# Patient Record
Sex: FEMALE | Race: WHITE | NOT HISPANIC OR LATINO | Employment: FULL TIME | URBAN - METROPOLITAN AREA
[De-identification: names, ages, dates, MRNs, and addresses within clinical notes are randomized per-mention and may not be internally consistent; named-entity substitution may affect disease eponyms.]

---

## 2022-09-19 ENCOUNTER — ANESTHESIA EVENT (OUTPATIENT)
Dept: OBGYN | Facility: CLINIC | Age: 32
End: 2022-09-19
Payer: COMMERCIAL

## 2022-09-19 ENCOUNTER — ANESTHESIA (OUTPATIENT)
Dept: OBGYN | Facility: CLINIC | Age: 32
End: 2022-09-19
Payer: COMMERCIAL

## 2022-09-19 ENCOUNTER — HOSPITAL ENCOUNTER (INPATIENT)
Facility: CLINIC | Age: 32
LOS: 15 days | Discharge: HOME HEALTH | End: 2022-10-04
Attending: OBSTETRICS & GYNECOLOGY | Admitting: OBSTETRICS & GYNECOLOGY
Payer: COMMERCIAL

## 2022-09-19 PROBLEM — O09.621 SUPERVISION OF SUBSEQUENT PREGNANCY, LESS THAN 16 YEARS OLD IN FIRST TRIMESTER: Status: ACTIVE | Noted: 2022-05-06

## 2022-09-19 PROBLEM — Z72.0 TOBACCO USE: Status: ACTIVE | Noted: 2022-09-19

## 2022-09-19 PROBLEM — E28.2 PCOS (POLYCYSTIC OVARIAN SYNDROME): Status: ACTIVE | Noted: 2022-09-19

## 2022-09-19 PROBLEM — O30.049 TWIN PREGNANCY, DICHORIONIC/DIAMNIOTIC, UNSPECIFIED TRIMESTER: Status: ACTIVE | Noted: 2022-04-09

## 2022-09-19 LAB
ABO/RH(D): ABNORMAL
ANTIBODY SCREEN: POSITIVE
BASOPHILS # BLD AUTO: 0 10E3/UL (ref 0–0.2)
BASOPHILS NFR BLD AUTO: 0 %
CREAT SERPL-MCNC: 0.47 MG/DL (ref 0.52–1.04)
EOSINOPHIL # BLD AUTO: 0 10E3/UL (ref 0–0.7)
EOSINOPHIL NFR BLD AUTO: 0 %
ERYTHROCYTE [DISTWIDTH] IN BLOOD BY AUTOMATED COUNT: 12.4 % (ref 10–15)
FETAL RBC % LFV: 0 %
FETAL RBC (ML): 0 ML
GFR SERPL CREATININE-BSD FRML MDRD: >90 ML/MIN/1.73M2
HCT VFR BLD AUTO: 34 % (ref 35–47)
HGB BLD-MCNC: 11.2 G/DL (ref 11.7–15.7)
HOLD SPECIMEN: NORMAL
HOLD SPECIMEN: NORMAL
IF INDICATED RECOMMENDED DOSE OF RH IMMUNE GLOBULIN UG: 300 UG
IMM GRANULOCYTES # BLD: 0.2 10E3/UL
IMM GRANULOCYTES NFR BLD: 1 %
LYMPHOCYTES # BLD AUTO: 2.1 10E3/UL (ref 0.8–5.3)
LYMPHOCYTES NFR BLD AUTO: 11 %
MCH RBC QN AUTO: 29.5 PG (ref 26.5–33)
MCHC RBC AUTO-ENTMCNC: 32.9 G/DL (ref 31.5–36.5)
MCV RBC AUTO: 90 FL (ref 78–100)
MONOCYTES # BLD AUTO: 0.2 10E3/UL (ref 0–1.3)
MONOCYTES NFR BLD AUTO: 1 %
NEUTROPHILS # BLD AUTO: 16.2 10E3/UL (ref 1.6–8.3)
NEUTROPHILS NFR BLD AUTO: 87 %
NRBC # BLD AUTO: 0 10E3/UL
NRBC BLD AUTO-RTO: 0 /100
PLATELET # BLD AUTO: 189 10E3/UL (ref 150–450)
RBC # BLD AUTO: 3.8 10E6/UL (ref 3.8–5.2)
SARS-COV-2 RNA RESP QL NAA+PROBE: NEGATIVE
SPECIMEN EXPIRATION DATE: ABNORMAL
WBC # BLD AUTO: 18.7 10E3/UL (ref 4–11)

## 2022-09-19 PROCEDURE — U0003 INFECTIOUS AGENT DETECTION BY NUCLEIC ACID (DNA OR RNA); SEVERE ACUTE RESPIRATORY SYNDROME CORONAVIRUS 2 (SARS-COV-2) (CORONAVIRUS DISEASE [COVID-19]), AMPLIFIED PROBE TECHNIQUE, MAKING USE OF HIGH THROUGHPUT TECHNOLOGIES AS DESCRIBED BY CMS-2020-01-R: HCPCS | Performed by: STUDENT IN AN ORGANIZED HEALTH CARE EDUCATION/TRAINING PROGRAM

## 2022-09-19 PROCEDURE — 76812 OB US DETAILED ADDL FETUS: CPT | Mod: 26 | Performed by: OBSTETRICS & GYNECOLOGY

## 2022-09-19 PROCEDURE — 99232 SBSQ HOSP IP/OBS MODERATE 35: CPT | Performed by: NURSE PRACTITIONER

## 2022-09-19 PROCEDURE — 36415 COLL VENOUS BLD VENIPUNCTURE: CPT | Performed by: STUDENT IN AN ORGANIZED HEALTH CARE EDUCATION/TRAINING PROGRAM

## 2022-09-19 PROCEDURE — 85004 AUTOMATED DIFF WBC COUNT: CPT | Performed by: STUDENT IN AN ORGANIZED HEALTH CARE EDUCATION/TRAINING PROGRAM

## 2022-09-19 PROCEDURE — 85460 HEMOGLOBIN FETAL: CPT | Performed by: STUDENT IN AN ORGANIZED HEALTH CARE EDUCATION/TRAINING PROGRAM

## 2022-09-19 PROCEDURE — 86901 BLOOD TYPING SEROLOGIC RH(D): CPT | Performed by: STUDENT IN AN ORGANIZED HEALTH CARE EDUCATION/TRAINING PROGRAM

## 2022-09-19 PROCEDURE — 370N000003 HC ANESTHESIA WARD SERVICE

## 2022-09-19 PROCEDURE — 99221 1ST HOSP IP/OBS SF/LOW 40: CPT | Mod: 25 | Performed by: OBSTETRICS & GYNECOLOGY

## 2022-09-19 PROCEDURE — 250N000011 HC RX IP 250 OP 636: Performed by: OBSTETRICS & GYNECOLOGY

## 2022-09-19 PROCEDURE — 250N000013 HC RX MED GY IP 250 OP 250 PS 637: Performed by: STUDENT IN AN ORGANIZED HEALTH CARE EDUCATION/TRAINING PROGRAM

## 2022-09-19 PROCEDURE — 00HU33Z INSERTION OF INFUSION DEVICE INTO SPINAL CANAL, PERCUTANEOUS APPROACH: ICD-10-PCS | Performed by: STUDENT IN AN ORGANIZED HEALTH CARE EDUCATION/TRAINING PROGRAM

## 2022-09-19 PROCEDURE — 120N000002 HC R&B MED SURG/OB UMMC

## 2022-09-19 PROCEDURE — 258N000003 HC RX IP 258 OP 636: Performed by: STUDENT IN AN ORGANIZED HEALTH CARE EDUCATION/TRAINING PROGRAM

## 2022-09-19 PROCEDURE — 76811 OB US DETAILED SNGL FETUS: CPT | Mod: 26 | Performed by: OBSTETRICS & GYNECOLOGY

## 2022-09-19 PROCEDURE — 3E0R3BZ INTRODUCTION OF ANESTHETIC AGENT INTO SPINAL CANAL, PERCUTANEOUS APPROACH: ICD-10-PCS | Performed by: STUDENT IN AN ORGANIZED HEALTH CARE EDUCATION/TRAINING PROGRAM

## 2022-09-19 PROCEDURE — 76818 FETAL BIOPHYS PROFILE W/NST: CPT | Mod: 26 | Performed by: OBSTETRICS & GYNECOLOGY

## 2022-09-19 PROCEDURE — 87086 URINE CULTURE/COLONY COUNT: CPT | Performed by: STUDENT IN AN ORGANIZED HEALTH CARE EDUCATION/TRAINING PROGRAM

## 2022-09-19 PROCEDURE — 250N000011 HC RX IP 250 OP 636: Performed by: STUDENT IN AN ORGANIZED HEALTH CARE EDUCATION/TRAINING PROGRAM

## 2022-09-19 PROCEDURE — 36415 COLL VENOUS BLD VENIPUNCTURE: CPT | Performed by: OBSTETRICS & GYNECOLOGY

## 2022-09-19 PROCEDURE — 250N000011 HC RX IP 250 OP 636

## 2022-09-19 PROCEDURE — 258N000003 HC RX IP 258 OP 636: Performed by: OBSTETRICS & GYNECOLOGY

## 2022-09-19 PROCEDURE — 82565 ASSAY OF CREATININE: CPT | Performed by: OBSTETRICS & GYNECOLOGY

## 2022-09-19 PROCEDURE — 59025 FETAL NON-STRESS TEST: CPT | Mod: 26 | Performed by: OBSTETRICS & GYNECOLOGY

## 2022-09-19 PROCEDURE — 87653 STREP B DNA AMP PROBE: CPT | Performed by: STUDENT IN AN ORGANIZED HEALTH CARE EDUCATION/TRAINING PROGRAM

## 2022-09-19 PROCEDURE — 86780 TREPONEMA PALLIDUM: CPT | Performed by: STUDENT IN AN ORGANIZED HEALTH CARE EDUCATION/TRAINING PROGRAM

## 2022-09-19 RX ORDER — FENTANYL CITRATE-0.9 % NACL/PF 10 MCG/ML
100 PLASTIC BAG, INJECTION (ML) INTRAVENOUS EVERY 5 MIN PRN
Status: DISCONTINUED | OUTPATIENT
Start: 2022-09-19 | End: 2022-09-22

## 2022-09-19 RX ORDER — PROCHLORPERAZINE 25 MG
25 SUPPOSITORY, RECTAL RECTAL EVERY 12 HOURS PRN
Status: DISCONTINUED | OUTPATIENT
Start: 2022-09-19 | End: 2022-09-22

## 2022-09-19 RX ORDER — CARBOPROST TROMETHAMINE 250 UG/ML
250 INJECTION, SOLUTION INTRAMUSCULAR
Status: DISCONTINUED | OUTPATIENT
Start: 2022-09-19 | End: 2022-09-22

## 2022-09-19 RX ORDER — ACETAMINOPHEN 325 MG/1
650 TABLET ORAL EVERY 4 HOURS PRN
Status: DISCONTINUED | OUTPATIENT
Start: 2022-09-19 | End: 2022-10-02

## 2022-09-19 RX ORDER — METOCLOPRAMIDE HYDROCHLORIDE 5 MG/ML
10 INJECTION INTRAMUSCULAR; INTRAVENOUS EVERY 6 HOURS PRN
Status: DISCONTINUED | OUTPATIENT
Start: 2022-09-19 | End: 2022-10-02

## 2022-09-19 RX ORDER — OXYTOCIN 10 [USP'U]/ML
10 INJECTION, SOLUTION INTRAMUSCULAR; INTRAVENOUS
Status: DISCONTINUED | OUTPATIENT
Start: 2022-09-19 | End: 2022-09-22

## 2022-09-19 RX ORDER — OXYTOCIN/0.9 % SODIUM CHLORIDE 30/500 ML
100-340 PLASTIC BAG, INJECTION (ML) INTRAVENOUS CONTINUOUS PRN
Status: DISCONTINUED | OUTPATIENT
Start: 2022-09-19 | End: 2022-09-22

## 2022-09-19 RX ORDER — CALCIUM CARBONATE 500(1250)
1 TABLET,CHEWABLE ORAL
COMMUNITY

## 2022-09-19 RX ORDER — PRENATAL VIT/IRON FUM/FOLIC AC 27MG-0.8MG
1 TABLET ORAL DAILY
Status: DISCONTINUED | OUTPATIENT
Start: 2022-09-19 | End: 2022-10-02

## 2022-09-19 RX ORDER — METOCLOPRAMIDE HYDROCHLORIDE 5 MG/ML
10 INJECTION INTRAMUSCULAR; INTRAVENOUS EVERY 6 HOURS PRN
Status: DISCONTINUED | OUTPATIENT
Start: 2022-09-19 | End: 2022-09-22

## 2022-09-19 RX ORDER — FENTANYL CITRATE-0.9 % NACL/PF 10 MCG/ML
PLASTIC BAG, INJECTION (ML) INTRAVENOUS
Status: DISCONTINUED
Start: 2022-09-19 | End: 2022-09-20 | Stop reason: HOSPADM

## 2022-09-19 RX ORDER — DIPHENHYDRAMINE HYDROCHLORIDE 50 MG/ML
25 INJECTION INTRAMUSCULAR; INTRAVENOUS EVERY 6 HOURS PRN
Status: DISCONTINUED | OUTPATIENT
Start: 2022-09-19 | End: 2022-10-02

## 2022-09-19 RX ORDER — ONDANSETRON 4 MG/1
4 TABLET, ORALLY DISINTEGRATING ORAL EVERY 6 HOURS PRN
Status: DISCONTINUED | OUTPATIENT
Start: 2022-09-19 | End: 2022-10-02

## 2022-09-19 RX ORDER — ONDANSETRON 4 MG/1
4 TABLET, ORALLY DISINTEGRATING ORAL EVERY 6 HOURS PRN
Status: DISCONTINUED | OUTPATIENT
Start: 2022-09-19 | End: 2022-09-22

## 2022-09-19 RX ORDER — CEFAZOLIN SODIUM 1 G/50ML
20 SOLUTION INTRAVENOUS EVERY 8 HOURS
Status: DISCONTINUED | OUTPATIENT
Start: 2022-09-19 | End: 2022-09-19 | Stop reason: HOSPADM

## 2022-09-19 RX ORDER — NALOXONE HYDROCHLORIDE 0.4 MG/ML
0.2 INJECTION, SOLUTION INTRAMUSCULAR; INTRAVENOUS; SUBCUTANEOUS
Status: DISCONTINUED | OUTPATIENT
Start: 2022-09-19 | End: 2022-09-22

## 2022-09-19 RX ORDER — PROCHLORPERAZINE 25 MG
25 SUPPOSITORY, RECTAL RECTAL EVERY 12 HOURS PRN
Status: DISCONTINUED | OUTPATIENT
Start: 2022-09-19 | End: 2022-10-02

## 2022-09-19 RX ORDER — KETOROLAC TROMETHAMINE 30 MG/ML
30 INJECTION, SOLUTION INTRAMUSCULAR; INTRAVENOUS
Status: DISCONTINUED | OUTPATIENT
Start: 2022-09-19 | End: 2022-09-22

## 2022-09-19 RX ORDER — CALCIUM CARBONATE 500 MG/1
500 TABLET, CHEWABLE ORAL DAILY PRN
Status: DISCONTINUED | OUTPATIENT
Start: 2022-09-19 | End: 2022-10-04 | Stop reason: HOSPADM

## 2022-09-19 RX ORDER — PROCHLORPERAZINE MALEATE 10 MG
10 TABLET ORAL EVERY 6 HOURS PRN
Status: DISCONTINUED | OUTPATIENT
Start: 2022-09-19 | End: 2022-09-22

## 2022-09-19 RX ORDER — ONDANSETRON 2 MG/ML
4 INJECTION INTRAMUSCULAR; INTRAVENOUS EVERY 6 HOURS PRN
Status: DISCONTINUED | OUTPATIENT
Start: 2022-09-19 | End: 2022-09-22

## 2022-09-19 RX ORDER — SODIUM CHLORIDE, SODIUM LACTATE, POTASSIUM CHLORIDE, CALCIUM CHLORIDE 600; 310; 30; 20 MG/100ML; MG/100ML; MG/100ML; MG/100ML
INJECTION, SOLUTION INTRAVENOUS CONTINUOUS
Status: DISCONTINUED | OUTPATIENT
Start: 2022-09-19 | End: 2022-09-22

## 2022-09-19 RX ORDER — METOCLOPRAMIDE 10 MG/1
10 TABLET ORAL EVERY 6 HOURS PRN
Status: DISCONTINUED | OUTPATIENT
Start: 2022-09-19 | End: 2022-10-02

## 2022-09-19 RX ORDER — PROCHLORPERAZINE MALEATE 10 MG
10 TABLET ORAL EVERY 6 HOURS PRN
Status: DISCONTINUED | OUTPATIENT
Start: 2022-09-19 | End: 2022-10-02

## 2022-09-19 RX ORDER — ONDANSETRON 2 MG/ML
4 INJECTION INTRAMUSCULAR; INTRAVENOUS EVERY 6 HOURS PRN
Status: DISCONTINUED | OUTPATIENT
Start: 2022-09-19 | End: 2022-10-02

## 2022-09-19 RX ORDER — POLYETHYLENE GLYCOL 3350 17 G/17G
17 POWDER, FOR SOLUTION ORAL DAILY
Status: DISCONTINUED | OUTPATIENT
Start: 2022-09-19 | End: 2022-10-02

## 2022-09-19 RX ORDER — MISOPROSTOL 200 UG/1
400 TABLET ORAL
Status: DISCONTINUED | OUTPATIENT
Start: 2022-09-19 | End: 2022-09-22

## 2022-09-19 RX ORDER — MISOPROSTOL 200 UG/1
800 TABLET ORAL
Status: DISCONTINUED | OUTPATIENT
Start: 2022-09-19 | End: 2022-09-22

## 2022-09-19 RX ORDER — FERROUS SULFATE 325(65) MG
325 TABLET, DELAYED RELEASE (ENTERIC COATED) ORAL DAILY
COMMUNITY
Start: 2022-09-15

## 2022-09-19 RX ORDER — NALOXONE HYDROCHLORIDE 0.4 MG/ML
0.4 INJECTION, SOLUTION INTRAMUSCULAR; INTRAVENOUS; SUBCUTANEOUS
Status: DISCONTINUED | OUTPATIENT
Start: 2022-09-19 | End: 2022-09-22

## 2022-09-19 RX ORDER — FENTANYL/ROPIVACAINE/NS/PF 2MCG/ML-.1
PLASTIC BAG, INJECTION (ML) EPIDURAL
Status: COMPLETED
Start: 2022-09-19 | End: 2022-09-19

## 2022-09-19 RX ORDER — IBUPROFEN 800 MG/1
800 TABLET, FILM COATED ORAL
Status: DISCONTINUED | OUTPATIENT
Start: 2022-09-19 | End: 2022-09-22

## 2022-09-19 RX ORDER — DOCUSATE SODIUM 100 MG/1
100 CAPSULE, LIQUID FILLED ORAL 2 TIMES DAILY
Status: DISCONTINUED | OUTPATIENT
Start: 2022-09-19 | End: 2022-10-02

## 2022-09-19 RX ORDER — CITRIC ACID/SODIUM CITRATE 334-500MG
30 SOLUTION, ORAL ORAL ONCE
Status: DISCONTINUED | OUTPATIENT
Start: 2022-09-19 | End: 2022-09-22

## 2022-09-19 RX ORDER — BETAMETHASONE SODIUM PHOSPHATE AND BETAMETHASONE ACETATE 3; 3 MG/ML; MG/ML
12 INJECTION, SUSPENSION INTRA-ARTICULAR; INTRALESIONAL; INTRAMUSCULAR; SOFT TISSUE ONCE
Status: COMPLETED | OUTPATIENT
Start: 2022-09-20 | End: 2022-09-20

## 2022-09-19 RX ORDER — FAMOTIDINE 10 MG
10 TABLET ORAL 2 TIMES DAILY
Status: DISCONTINUED | OUTPATIENT
Start: 2022-09-19 | End: 2022-10-04 | Stop reason: HOSPADM

## 2022-09-19 RX ORDER — OXYTOCIN/0.9 % SODIUM CHLORIDE 30/500 ML
340 PLASTIC BAG, INJECTION (ML) INTRAVENOUS CONTINUOUS PRN
Status: DISCONTINUED | OUTPATIENT
Start: 2022-09-19 | End: 2022-09-22

## 2022-09-19 RX ORDER — METHYLERGONOVINE MALEATE 0.2 MG/ML
200 INJECTION INTRAVENOUS
Status: DISCONTINUED | OUTPATIENT
Start: 2022-09-19 | End: 2022-09-22

## 2022-09-19 RX ORDER — DIPHENHYDRAMINE HCL 25 MG
25 CAPSULE ORAL EVERY 6 HOURS PRN
Status: DISCONTINUED | OUTPATIENT
Start: 2022-09-19 | End: 2022-10-02

## 2022-09-19 RX ORDER — CITRIC ACID/SODIUM CITRATE 334-500MG
30 SOLUTION, ORAL ORAL
Status: DISCONTINUED | OUTPATIENT
Start: 2022-09-19 | End: 2022-09-22

## 2022-09-19 RX ORDER — NALBUPHINE HYDROCHLORIDE 10 MG/ML
2.5-5 INJECTION, SOLUTION INTRAMUSCULAR; INTRAVENOUS; SUBCUTANEOUS EVERY 6 HOURS PRN
Status: DISCONTINUED | OUTPATIENT
Start: 2022-09-19 | End: 2022-09-22

## 2022-09-19 RX ORDER — METOCLOPRAMIDE 10 MG/1
10 TABLET ORAL EVERY 6 HOURS PRN
Status: DISCONTINUED | OUTPATIENT
Start: 2022-09-19 | End: 2022-09-22

## 2022-09-19 RX ORDER — FENTANYL/ROPIVACAINE/NS/PF 2MCG/ML-.1
PLASTIC BAG, INJECTION (ML) EPIDURAL
Status: DISCONTINUED | OUTPATIENT
Start: 2022-09-19 | End: 2022-09-20 | Stop reason: DRUGHIGH

## 2022-09-19 RX ORDER — CLINDAMYCIN PHOSPHATE 900 MG/50ML
900 INJECTION, SOLUTION INTRAVENOUS EVERY 8 HOURS
Status: DISCONTINUED | OUTPATIENT
Start: 2022-09-19 | End: 2022-09-19 | Stop reason: HOSPADM

## 2022-09-19 RX ADMIN — Medication: at 19:28

## 2022-09-19 RX ADMIN — CALCIUM CARBONATE (ANTACID) CHEW TAB 500 MG 500 MG: 500 CHEW TAB at 18:32

## 2022-09-19 RX ADMIN — SODIUM CHLORIDE, POTASSIUM CHLORIDE, SODIUM LACTATE AND CALCIUM CHLORIDE 1000 ML: 600; 310; 30; 20 INJECTION, SOLUTION INTRAVENOUS at 19:00

## 2022-09-19 RX ADMIN — ACETAMINOPHEN 650 MG: 325 TABLET, FILM COATED ORAL at 16:50

## 2022-09-19 RX ADMIN — FAMOTIDINE 10 MG: 10 TABLET ORAL at 18:32

## 2022-09-19 RX ADMIN — CLINDAMYCIN PHOSPHATE 900 MG: 900 INJECTION, SOLUTION INTRAVENOUS at 15:48

## 2022-09-19 RX ADMIN — VANCOMYCIN HYDROCHLORIDE 1250 MG: 10 INJECTION, POWDER, LYOPHILIZED, FOR SOLUTION INTRAVENOUS at 19:50

## 2022-09-19 RX ADMIN — SODIUM CHLORIDE, POTASSIUM CHLORIDE, SODIUM LACTATE AND CALCIUM CHLORIDE 1000 ML: 600; 310; 30; 20 INJECTION, SOLUTION INTRAVENOUS at 15:09

## 2022-09-19 ASSESSMENT — ACTIVITIES OF DAILY LIVING (ADL)
ADLS_ACUITY_SCORE: 18
ADLS_ACUITY_SCORE: 18
ADLS_ACUITY_SCORE: 31
ADLS_ACUITY_SCORE: 31
ADLS_ACUITY_SCORE: 18

## 2022-09-19 ASSESSMENT — LIFESTYLE VARIABLES: TOBACCO_USE: 0

## 2022-09-19 NOTE — PROVIDER NOTIFICATION
22 1545   Provider Notification   Provider Name/Title Dr Contreras/Dr Jalloh   Method of Notification At Bedside   Notification Reason Status Update   MFM team at bedside assessing patient. Brief bedside ultrasound, vertex-vertex, no fluid around Twin A. Twin B still with intermittent variables. Continuous monitoring. Discussed vaginal delivery in OR vs  section, paper consent signed.

## 2022-09-19 NOTE — PROVIDER NOTIFICATION
09/19/22 1450   Provider Notification   Provider Name/Title Dr Contreras/Dr Jalloh   Method of Notification In Department   Notification Reason Status Update   Twin B with variable decels. Contractions continue q 2-5 minutes, will start fluid bolus now. Covid swab and GBS swab collected/sent. Awaiting urine specimen for urine culture.

## 2022-09-19 NOTE — PROVIDER NOTIFICATION
09/19/22 1745   Provider Notification   Provider Name/Title Dr Contreras/Dr Abad   Method of Notification In Department   Notification Reason Labor Status   Contractions continue q3 minutes, pt feeling more uncomfortable, would like sterile spec/SVE after her  arrives. NNP to complete consult when  present.

## 2022-09-19 NOTE — PROGRESS NOTES
9/19/2022 6:47 PM Patient increasingly uncomfortable with contractions.  SVE 4/90/-1.  Will transfer to L&D, initiate Vancomycin for GBS unknown with severe PCN allergy, and request epidural.  She is in agreement with plan.    cEFM review A baseline 140, + accels, - decels, moderate variability.  B baseline 135, + accels, interimttent variable decels, moderate variability.  Overall reactive and reassuring for gestational age.  Lolo q2 min regular contractions.    Staffed with Dr. Ben Jalloh MD   Maternal-Fetal Medicine Fellow PGY6

## 2022-09-19 NOTE — PROVIDER NOTIFICATION
22 1345   Provider Notification   Provider Name/Title Dr Jalloh   Method of Notification In Department   Notification Reason Patient Arrived    at 32.1 wks di-di twins arrived as transfer from [place] with PROM of twin A at 0400. S/p first dose of beta, clindamycin, and azithromycin at 0745. Leaking small amt fluid, pt reports had bleeding with initial rupture, denies further bleeding. Rh neg, rhogam received on 22. Contractions mildly painful ~5minutes, EFM applied.

## 2022-09-19 NOTE — H&P
"[facility]  OB History and Physical      Osman Banks MRN# [id number]   Age: 32 year old YOB: 1990     CC:  PPROM    HPI:  Ms. Osman Banks is a 32 year old  at 32w1d by LMP c/w 8w5d US, who is being transferred from [place] with PPROM. She states that she woke up this morning around 0400 to go to the bathroom and noticed a significant loss of blood-tinged fluid in the toilet afterwards. Afterwards, she started to notice feeling some contractions every 7-10 minutes that she described as \"uncomfortable\".     She arrived at an OSH in [facility] for evaluation. Amnisure was collected and was positive. She was started on BMZ and received her first dose on  at 0745. She was also started on latency antibiotics with clindamycin/azithromycin prior to transfer. GBS and GC/Chlam collected prior to transfer; results are pending.     Currently, she states that she has had \"trickles\" of fluid since this morning. She feels that her fluid was initially more tinged with bright red blood and has now changed to brown, \"similar to a period\". Reports regular fetal movement x2. She has continued to have contractions while inpatient and feels that they are uncomfortable more than her baseline [place] Emmanuel that she has been having. Also notes some low back \"aches\" that started today. Of note, she has been having intermittent diarrhea over the past two and a half weeks that was being worked up by her primary OB. She last reports loose stool yesterday morning. Outside of this, has been feeling well and has no other complaints. Denies fevers, new onset headache, vision changes, or swelling.     Pregnancy Complications:  1. Di-di twin pregnancy   2. ART with letrozole in this pregnancy   3. Anemia  4. Hx gestational HTN on low dose ASA  5. Hx of PCOS on metformin prior to pregnancy   6. Rh negative, s/p Rhogam     Prenatal Labs:   - ABO B, Rh negative, antibody screen " negative   - HepB Ag negative    - GC/TC negative  - RPR nonreactive  - Rubella immune  - HIV negative     GBS Status: pending    Ultrasounds  1. 7 Jer[place]: Twin A- cephalic presentation, normal amniotic fluid level with MVP 4.2 cm, EFW 54%; Twin B- cephalic presentation, normal amniotic fluid level with MVP 5.6 cm, EFW 77%  2. 8 US: Twin A- cephalic, EFW 1096g, 32%; Twin B- cephalic, EFW 1100g, 33%    OB History  OB History    Para Term  AB Living   2 1 1 0 0 1   SAB IAB Ectopic Multiple Live Births   0 0 0 0 1      # Outcome Date GA Lbr Cesar/2nd Weight Sex Delivery Anes PTL Lv   2 Current            1 Term 19 39w2d  3.544 kg (7 lb 13 oz) M Vag-Spont  N SERVANDO     PMHx:  Past Medical History:   Diagnosis Date     PCOS (polycystic ovarian syndrome)      PSHx:   Past Surgical History:   Procedure Laterality Date     TOOTH EXTRACTION       Meds:   Medications Prior to Admission   Medication Sig Dispense Refill Last Dose     aspirin (ASA) 81 MG EC tablet Take 81 mg by mouth   2022 at Unknown time     calcium carbonate 500 mg, elemental, 1250 (500 Ca) MG tablet chewable Take 1 tablet by mouth   2022 at Unknown time     ferrous sulfate (FE TABS) 325 (65 Fe) MG EC tablet Take 325 mg by mouth daily   2022 at Unknown time     Allergies:    Allergies   Allergen Reactions     Penicillins Swelling     Throat swelling     Morphine Other (See Comments)      FmHx:   Family History   Problem Relation Age of Onset     Chronic Obstructive Pulmonary Disease Father      Diverticulitis Father      Hypertension Maternal Grandfather      Diabetes Maternal Grandfather      Heart Disease Maternal Grandfather      Heart Disease Paternal Grandmother      Heart Disease Paternal Grandfather      Hypertension Paternal Grandfather      SocHx: Reports tobacco use (4-5 cigarettes a day), denies alcohol or other drug use during pregnancy.    ROS:   Complete 10-point ROS negative except as noted in  "HPI.  PE:  Vit:   Patient Vitals for the past 4 hrs:   BP Temp Temp src Pulse Resp Height Weight   22 1450 120/69 98.2  F (36.8  C) Oral 84 20 1.676 m (5' 6\") 87.5 kg (193 lb)      Gen: Well-appearing, NAD, comfortable   CV: rrr, no mrg  Pulm: Ctab, no wheezes or crackles  Abd: Soft, gravid, non-tender  Ext: No LE edema b/l             FHT: - Twin A: Baseline 1335, moderate variability, accelerations, no decelerations    - Twin B: Baseline 140, moderate variability, accelerations, intermittent variable decelerations   [place]: 3 contractions in 10 minutes      Bedside ultrasound: Cephalic/Cephalic. Anhydramnios for twin 1, MVP 3.6 for twin 2    Assessment  Ms. Osman Banks is a 32 year old  at 32w1d by LMP c/w 8w5d US, being admitted for PPROM this morning in the setting of di-di twin gestation. Discussed with patient standard workup for PPROM as well as importance of regular monitoring during initial admit period. Reviewed likelihood of spontaneous labor within the next week after ROM and importance of fetal monitoring while inpatient. Discussed options for delivery; patient desires vaginal delivery if possible; has been consented for C/s with salpingectomy as well as operative vaginal extraction if indicated. Will admit for management of PPROM with the plan to continue with latency antibiotics, BMZ for fetal lung maturity, and external fetal monitoring.     Plan  # PPROM  - Amnisure positive  prior to transfer   - D#1 latency antibiotics with clindamycin/azithromycin  - S/p BMZ dose 1 0745 on , plan for 2nd dose   - Follow up UA, urine GC/TC, GBS  - Comp US with BPP tomorrow   - NICU/SW/Child and Family Life consults   - Vancomycin for GBS prophylaxis if labors and GBS remains unknown  - Desires vaginal delivery; consented for C/S if indicated, including C/S for second twin if indicated. Planning BTL if requires C/S.    # Fetal Well Being  - FHT reactive and reassuring x2  - " Continue continuous EFM and [place] with plan to transition to TID monitoring pending clinical stability     # Routine PNC   - GCT normal    - Rh negative, Rhogam , type and screen q72hrs   - GBS pending from OSH     - s/p TdAP 22  - PP contraception: desires permanent sterilization in the event of C/s, consent in chart     # Tobacco use in pregnancy   - Consider nicotine replacement while inpatient; patient currently declines     Mel Nelson, MS4    Physician Attestation   I, Maritza Contreras MD, was present with the medical/CHLOE student who participated in the service and in the documentation of the note.  I have verified the history and personally performed the physical exam and medical decision making.  I agree with the assessment and plan of care as documented in the note.      I personally reviewed vital signs, medications, labs and imaging.    Key findings: 33 yo  at 32 1/7 weeks with di-di twin gestation transferred from [facility] for management of PPROM. PPROM confirmed with +pooling and +Amnisure. Started on latency antibiotics and given dose of BMZ prior to transfer. Pregnancy complicated by tobacco use and di-di twin gestation. Last growth ultrasound was 21 and twin 1 was 3#11ounces and twin 2 was 3#9 ounces per Osman's report.    Plan to continue latency antibiotics and complete BMZ course. Delivery recommended at 34 weeks gestation, or earlier if there is onset of labor, significant abruption, non-reassuring fetal status, or infection. Will require Vancomycin for GBS prophylaxis if GBS is unknown.    Discussed the route of delivery. Osman desires vaginal delivery if possible. She was counseled on the risks, benefits and alternatives of vaginal delivery, potential for vaginal followed by  section for second twin, as well as  section. We discussed that while the EFW for twin 2 is anticipated to be >1500 grams and there is <15% discordance, there is not always  a provider available the performs breech extraction of the second twin. Osman does desire permanent sterilization and the consent was completed today.    Maritza Contreras MD  Date of Service (when I saw the patient): 09/19/22

## 2022-09-20 ENCOUNTER — APPOINTMENT (OUTPATIENT)
Dept: ULTRASOUND IMAGING | Facility: CLINIC | Age: 32
End: 2022-09-20
Payer: COMMERCIAL

## 2022-09-20 LAB
GP B STREP DNA SPEC QL NAA+PROBE: NEGATIVE
T PALLIDUM AB SER QL: NONREACTIVE

## 2022-09-20 PROCEDURE — 76812 OB US DETAILED ADDL FETUS: CPT

## 2022-09-20 PROCEDURE — 250N000011 HC RX IP 250 OP 636: Performed by: STUDENT IN AN ORGANIZED HEALTH CARE EDUCATION/TRAINING PROGRAM

## 2022-09-20 PROCEDURE — 76819 FETAL BIOPHYS PROFIL W/O NST: CPT | Mod: 59

## 2022-09-20 PROCEDURE — 250N000011 HC RX IP 250 OP 636: Performed by: OBSTETRICS & GYNECOLOGY

## 2022-09-20 PROCEDURE — 250N000011 HC RX IP 250 OP 636: Performed by: ANESTHESIOLOGY

## 2022-09-20 PROCEDURE — 258N000003 HC RX IP 258 OP 636: Performed by: OBSTETRICS & GYNECOLOGY

## 2022-09-20 PROCEDURE — 258N000003 HC RX IP 258 OP 636: Performed by: STUDENT IN AN ORGANIZED HEALTH CARE EDUCATION/TRAINING PROGRAM

## 2022-09-20 PROCEDURE — 99232 SBSQ HOSP IP/OBS MODERATE 35: CPT | Mod: 25 | Performed by: OBSTETRICS & GYNECOLOGY

## 2022-09-20 PROCEDURE — 250N000013 HC RX MED GY IP 250 OP 250 PS 637: Performed by: STUDENT IN AN ORGANIZED HEALTH CARE EDUCATION/TRAINING PROGRAM

## 2022-09-20 PROCEDURE — 120N000002 HC R&B MED SURG/OB UMMC

## 2022-09-20 RX ORDER — FENTANYL/ROPIVACAINE/NS/PF 2MCG/ML-.1
PLASTIC BAG, INJECTION (ML) EPIDURAL CONTINUOUS
Status: DISCONTINUED | OUTPATIENT
Start: 2022-09-20 | End: 2022-09-22

## 2022-09-20 RX ORDER — CLINDAMYCIN PHOSPHATE 900 MG/50ML
900 INJECTION, SOLUTION INTRAVENOUS EVERY 8 HOURS
Status: COMPLETED | OUTPATIENT
Start: 2022-09-20 | End: 2022-09-21

## 2022-09-20 RX ADMIN — BETAMETHASONE SODIUM PHOSPHATE AND BETAMETHASONE ACETATE 12 MG: 3; 3 INJECTION, SUSPENSION INTRA-ARTICULAR; INTRALESIONAL; INTRAMUSCULAR at 07:44

## 2022-09-20 RX ADMIN — SODIUM CHLORIDE, POTASSIUM CHLORIDE, SODIUM LACTATE AND CALCIUM CHLORIDE: 600; 310; 30; 20 INJECTION, SOLUTION INTRAVENOUS at 00:32

## 2022-09-20 RX ADMIN — SODIUM CHLORIDE, POTASSIUM CHLORIDE, SODIUM LACTATE AND CALCIUM CHLORIDE: 600; 310; 30; 20 INJECTION, SOLUTION INTRAVENOUS at 15:33

## 2022-09-20 RX ADMIN — Medication 100 MCG: at 04:27

## 2022-09-20 RX ADMIN — PRENATAL VITAMINS-IRON FUMARATE 27 MG IRON-FOLIC ACID 0.8 MG TABLET 1 TABLET: at 07:44

## 2022-09-20 RX ADMIN — VANCOMYCIN HYDROCHLORIDE 1250 MG: 10 INJECTION, POWDER, LYOPHILIZED, FOR SOLUTION INTRAVENOUS at 03:59

## 2022-09-20 RX ADMIN — CLINDAMYCIN PHOSPHATE 900 MG: 900 INJECTION, SOLUTION INTRAVENOUS at 20:34

## 2022-09-20 RX ADMIN — CALCIUM CARBONATE (ANTACID) CHEW TAB 500 MG 500 MG: 500 CHEW TAB at 22:16

## 2022-09-20 RX ADMIN — VANCOMYCIN HYDROCHLORIDE 1250 MG: 10 INJECTION, POWDER, LYOPHILIZED, FOR SOLUTION INTRAVENOUS at 12:23

## 2022-09-20 RX ADMIN — FAMOTIDINE 10 MG: 10 TABLET ORAL at 22:16

## 2022-09-20 RX ADMIN — Medication: at 12:59

## 2022-09-20 RX ADMIN — Medication: at 05:28

## 2022-09-20 RX ADMIN — FAMOTIDINE 10 MG: 10 TABLET ORAL at 07:44

## 2022-09-20 ASSESSMENT — ACTIVITIES OF DAILY LIVING (ADL)
ADLS_ACUITY_SCORE: 18
ADLS_ACUITY_SCORE: 24
ADLS_ACUITY_SCORE: 18
ADLS_ACUITY_SCORE: 24
ADLS_ACUITY_SCORE: 18
ADLS_ACUITY_SCORE: 24
ADLS_ACUITY_SCORE: 18
ADLS_ACUITY_SCORE: 24
ADLS_ACUITY_SCORE: 24

## 2022-09-20 NOTE — ANESTHESIA PROCEDURE NOTES
Epidural catheter Procedure Note    Pre-Procedure   Staff -        Anesthesiologist:  Mable Tan MD       Resident/Fellow: Arash La MD       Performed By: resident       Location: OB       Procedure Start/Stop Times: 9/19/2022 7:15 PM and 9/19/2022 7:25 PM       Pre-Anesthestic Checklist: patient identified, IV checked, risks and benefits discussed, informed consent, monitors and equipment checked, pre-op evaluation, at physician/surgeon's request and post-op pain management  Timeout:       Correct Patient: Yes        Correct Procedure: Yes        Correct Site: Yes        Correct Position: Yes   Procedure Documentation  Procedure: epidural catheter       Diagnosis: labor pain       Patient Position: sitting       Skin prep: Chloraprep       Local skin infiltrated with mL of 1% lidocaine.        Insertion Site: L3-4. (midline approach).       Technique: LORT saline        NATALIA at 6.5 cm.       Needle Type: Touhy needle       Needle Gauge: 17.        Needle Length (Inches): 3.5        Catheter: 19 G.          Catheter threaded easily.           Threaded 11 cm at skin.         # of attempts: 1 and  # of redirects:  1    Assessment/Narrative         Paresthesias: No.       Test dose of 3 mL lidocaine 1.5% w/ 1:200,000 epinephrine at 19:20 CDT.         Test dose negative, 3 minutes after injection, for signs of intravascular, subdural, or intrathecal injection.       Insertion/Infusion Method: LORT saline       Aspiration negative for Heme or CSF via Epidural Catheter.    Medication(s) Administered   0.125% Bupivacaine + 2 mcg/mL Fentanyl via CADD (Epidural) - EPIDURAL   10 mL - 9/19/2022 7:25:00 PM  Medication Administration Time: 9/19/2022 7:15 PM

## 2022-09-20 NOTE — PLAN OF CARE
Goal Outcome Evaluation:    Plan of Care Reviewed With: patient, spouse     Overall Patient Progress: improving     Pt admitted for PPROM of twin A and contractions. RN assumed care at 1900. VSS, EFM charted (see flowsheet), pt afebrile. Pt had one low BP during the night, treated with phenylephrine. Pt denies headache, vision changes, RUQ pain, bleeding. Pt comfortable in bed with support person, Ernesto, at bedside. Plan per provider is to monitor labor status with expectant management. Pt stated she has no questions or concerns with plan of care at this time. Call light within reach. Report given to Maureen QUIROZ.

## 2022-09-20 NOTE — PROGRESS NOTES
"Labor Progress Note     S: Was able to get a short rest. Feeling contractions a bit through the epidural but tolerable. Coming every 15 minutes or so. Continues to feel intermittent pressure. Wondering what the plan is.    O:  /59   Pulse 93   Temp 98.8  F (37.1  C) (Oral)   Resp 16   Ht 1.676 m (5' 6\")   Wt 87.5 kg (193 lb)   SpO2 97%   BMI 31.15 kg/m    General: NAD  VE: deferred    FHT: A - Baseline 135, moderate variability, accelerations present, no decelerations          B - Baseline 145, moderate variability, accelerations present, no decelerations  TOCO: 1 contractions in ten minutes    A/P: 32 year old  at 32w2d, here for SROM and spontaneous  labor with di-di twin gestation.     - Labor: Continue expectant management. Discussed at this gestational age would not augment labor unless signs of infection present. Will try to avoid unnecessary SVE given increased risk for infection with more checks; plan to repeat if pt feeling significant change or otherwise clinically indicated. Discussed possibility of labor slowing down, and if stable over some time, could move back over to antepartum. For now, will continue with vanc GBS prophylaxis, epidural, and anticipation of possible  given rapid cervical change yesterday evening.  - FWB: Category I for both twins, reactive and reassuring. Continuous monitoring.    Pearl Abad MD  ObGyn, PGY-3  2022 5:38 AM      "

## 2022-09-20 NOTE — ANESTHESIA PREPROCEDURE EVALUATION
Anesthesia Pre-Procedure Evaluation    Patient: Osman Banks   MRN: [id number] : 1990        Procedure :           Past Medical History:   Diagnosis Date     PCOS (polycystic ovarian syndrome)      Tobacco use       Past Surgical History:   Procedure Laterality Date     TOOTH EXTRACTION        Allergies   Allergen Reactions     Penicillins Swelling     Throat swelling     Morphine Other (See Comments)      Social History     Tobacco Use     Smoking status: Not on file     Smokeless tobacco: Not on file   Substance Use Topics     Alcohol use: Not on file      Wt Readings from Last 1 Encounters:   22 87.5 kg (193 lb)        Anesthesia Evaluation            ROS/MED HX  ENT/Pulmonary: Comment: Tobacco use   (-) tobacco use   Neurologic:  - neg neurologic ROS     Cardiovascular:  - neg cardiovascular ROS     METS/Exercise Tolerance:     Hematologic:  - neg hematologic  ROS     Musculoskeletal:  - neg musculoskeletal ROS     GI/Hepatic:  - neg GI/hepatic ROS     Renal/Genitourinary:  - neg Renal ROS     Endo: Comment:   PCOS - neg endo ROS     Psychiatric/Substance Use:  - neg psychiatric ROS     Infectious Disease:  - neg infectious disease ROS     Malignancy:  - neg malignancy ROS     Other: Comment:   , with  x1.   Now:  premature rupture of membranes in third trimester  Twin pregnancy, dichorionic/diamniotic,       Di-Di twins at 32 weeks         Physical Exam    Airway        Mallampati: II   TM distance: > 3 FB   Neck ROM: full   Mouth opening: > 3 cm    Respiratory Devices and Support         Dental  no notable dental history         Cardiovascular   cardiovascular exam normal          Pulmonary   pulmonary exam normal                OUTSIDE LABS:  CBC:   Lab Results   Component Value Date    WBC 18.7 (H) 2022    HGB 11.2 (L) 2022    HCT 34.0 (L) 2022     2022     BMP: No results found for: NA, POTASSIUM, CHLORIDE, CO2, BUN, CR, GLC  COAGS: No  results found for: PTT, INR, FIBR  POC: No results found for: BGM, HCG, HCGS  HEPATIC: No results found for: ALBUMIN, PROTTOTAL, ALT, AST, GGT, ALKPHOS, BILITOTAL, BILIDIRECT, MARBELLA  OTHER: No results found for: PH, LACT, A1C, ADENIKE, PHOS, MAG, LIPASE, AMYLASE, TSH, T4, T3, CRP, SED    Anesthesia Plan    ASA Status:  2      Anesthesia Type: Epidural.              Consents    Anesthesia Plan(s) and associated risks, benefits, and realistic alternatives discussed. Questions answered and patient/representative(s) expressed understanding.    - Discussed:     - Discussed with:  Patient         Postoperative Care            Comments:                Arash La MD

## 2022-09-20 NOTE — PROVIDER NOTIFICATION
09/19/22 1930   Fetal Assessment   Fetal HR Assessment Method external US   Fetal HR (beats/min) 145   Fetal HR Baseline normal range   Fetal HR Variability moderate (amplitude range 6 to 25 bpm)   Fetal HR Accelerations increase 15 bpm above baseline lasting 15 seconds   Fetal HR Decelerations variable   RN Strip Review Continuous   Fetal Assessment B   Fetal HR Assessment Method B external US   Fetal HR B (beats/min) 140   Fetal HR Baseline B normal range   Fetal HR Variability B moderate (amplitude range 6 to 25 bpm)   Fetal HR Accelerations B increase 15 bpm above baseline lasting 15 seconds   Fetal HR Decelerations B absent   Tracing maternal HR from 2671-2441 while pt was positioned for epidural placement. RN at bedside.

## 2022-09-20 NOTE — PHARMACY-VANCOMYCIN DOSING SERVICE
"Pharmacy Vancomycin Note  Date of Service 2022  Patient's  1990   32 year old, female    Indication: GBS prophylaxis  Day of Therapy: 1  Current vancomycin regimen:  1250 mg IV q8h  Current vancomycin monitoring method: AUC  Current vancomycin therapeutic monitoring goal: 400-600 mg*h/L    [employer] Prediction of Current Vancomycin Regimen  Loading dose: N/A  Regimen: 1250 mg IV every 8 hours.  Start time: 14:23 on 2022  Exposure target: AUC24 (range)400-600 mg/L.hr   AUC24,ss: 695 mg/L.hr  Probability of AUC24 > 400: 92 %  Ctrough,ss: 23.1 mg/L  Probability of Ctrough,ss > 20: 61 %  Probability of nephrotoxicity (Lodise DODIE ): 23 %    Current estimated CrCl = Estimated Creatinine Clearance: 193.3 mL/min (A) (based on SCr of 0.47 mg/dL (L)).    Creatinine for last 3 days  2022:  9:09 PM Creatinine 0.47 mg/dL    Recent Vancomycin Levels (past 3 days)  No results found for requested labs within last 72 hours.    Vancomycin IV Administrations (past 72 hours)                   vancomycin 1250 mg in 0.9% NaCl 250 mL intermittent infusion 1,250 mg (mg) 1,250 mg New Bag 22 1223     1,250 mg New Bag  0359    vancomycin 1250 mg in 0.9% NaCl 250 mL intermittent infusion 1,250 mg (mg) 1,250 mg New Bag 22 1950                Nephrotoxins and other renal medications (From now, onward)    Start     Dose/Rate Route Frequency Ordered Stop    22 0400  vancomycin 1250 mg in 0.9% NaCl 250 mL intermittent infusion 1,250 mg         15 mg/kg × 87.5 kg  over 90 Minutes Intravenous EVERY 8 HOURS 22 185  ketorolac (TORADOL) injection 30 mg        \"Or\" Linked Group Details    30 mg Intravenous ONCE PRN 22 18522 185  ketorolac (TORADOL) injection 30 mg        \"Or\" Linked Group Details    30 mg Intramuscular ONCE PRN 22 18522 1850  ibuprofen (ADVIL/MOTRIN) tablet 800 mg        \"Or\" Linked Group Details "    800 mg Oral ONCE PRN 09/19/22 1851 09/24/22 1849             Contrast Orders - past 72 hours (72h ago, onward)    None          Interpretation of levels and current regimen:    [employer]ight[employer] Prediction of Planned New Vancomycin Regimen  Loading dose: N/A  Regimen: 1250 mg IV every 12 hours.  Start time: 14:23 on 09/20/2022  Exposure target: AUC24 (range)400-600 mg/L.hr   AUC24,ss: 467 mg/L.hr  Probability of AUC24 > 400: 65 %  Ctrough,ss: 13.8 mg/L  Probability of Ctrough,ss > 20: 24 %  Probability of nephrotoxicity (Lodise DODIE 2009): 9 %    Plan:  1. With new information for patient's kidney function (SCr result last night), ran the [employer] kinetics program to get improved empiric dose. Based on the dose of 1250 mg IV q8h the expected AUC was ~700.   i. Will elect to Decrease Dose to 1250 mg IV q12h to target AUC goal 400-600.   2. Vancomycin monitoring method: AUC  3. Vancomycin therapeutic monitoring goal: 400-600 mg*h/L  4. Pharmacy will check vancomycin levels as appropriate tomorrow AM  5. Serum creatinine levels will be ordered a minimum of twice weekly.    Margie Gardner [facility]

## 2022-09-20 NOTE — CONSULTS
Neonatology Antepartum Counseling Consult    I was asked to provide antepartum counseling for Osman Banks at the request of Maritza Contreras MD secondary to ROM and  labor. Ms. Banks is currently 32 weeks with di/di twins and has a hx significant for gHTN, pregnancy assisted by letrozole. Betamethasone was first administered this morning, next dose due tomorrow at about 0800. Ms. Banks, accompanied by her , was counseled on the expected hospital course, potential risks, and outcomes associated with an infant born at approximately 32 weeks gestation. The counseling included: mortality, initial delivery room stabilization, respiratory course, lung development, risk of hyperbilirubinemia, risk for intraventricular hemorrhage, infection prevention, methods of nutrition, growth and development, and long term outcomes.     Please feel free to call with any additional questions or concerns.        MATHIEU Hodge, NNP-BC      Nurse Practitioner Service    Intensive Care Unit  [facility]      Floor Time (min): 5  Face to Face Time (min): 25  Total Time (minutes): 30  More than 50% of my time was spent in direct, face to face, antepartum counseling with the above patient.

## 2022-09-20 NOTE — DISCHARGE SUMMARY
[facility] Discharge Summary    Osman Banks MRN# [id number]   Age: 32 year old YOB: 1990     Date of Admission:  2022  Date of Discharge::  10/04/22   Admitting Physician:  Maritza Contreras MD  Discharge Physician:  Viki Dumont MD             Admission Diagnoses:   -  with intrauterine pregnancy at 32w2d  - PPROM   - Di-di twin gestation  - ART with letrozole in this pregnancy   - Anemia   - Rh negative, s/p Rhogam   - Tobacco use in pregnancy   - Hx of gestational HTN on low dose ASA  - Hx of PCOS on metformin prior to pregnancy           Discharge Diagnosis:     - Same, delivered   - mild ABLA          Procedures:     Procedure(s):   Epidural anesthesia                Medications Prior to Admission:     Medications Prior to Admission   Medication Sig Dispense Refill Last Dose     calcium carbonate 500 mg, elemental, 1250 (500 Ca) MG tablet chewable Take 1 tablet by mouth   2022 at Unknown time     ferrous sulfate (FE TABS) 325 (65 Fe) MG EC tablet Take 325 mg by mouth daily   2022 at Unknown time     [DISCONTINUED] aspirin (ASA) 81 MG EC tablet Take 81 mg by mouth   2022 at Unknown time             Discharge Medications:        Review of your medicines      START taking      Dose / Directions   acetaminophen 325 MG tablet  Commonly known as: TYLENOL  Used for:  (normal spontaneous vaginal delivery)      Dose: 650 mg  Take 2 tablets (650 mg) by mouth every 6 hours as needed for mild pain Start after Delivery.  Quantity: 100 tablet  Refills: 0     ibuprofen 600 MG tablet  Commonly known as: ADVIL/MOTRIN  Used for:  (normal spontaneous vaginal delivery)      Dose: 600 mg  Take 1 tablet (600 mg) by mouth every 6 hours as needed for moderate pain Start after delivery  Quantity: 60 tablet  Refills: 0     senna-docusate 8.6-50 MG tablet  Commonly known as: SENOKOT-S/PERICOLACE  Used for:  (normal spontaneous vaginal delivery)       "Dose: 1 tablet  Take 1 tablet by mouth daily Start after delivery.  Quantity: 100 tablet  Refills: 0        CONTINUE these medicines which have NOT CHANGED      Dose / Directions   calcium carbonate 500 mg (elemental) 1250 (500 Ca) MG tablet chewable      Dose: 1 tablet  Take 1 tablet by mouth  Refills: 0     ferrous sulfate 325 (65 Fe) MG EC tablet  Commonly known as: FE TABS      Dose: 325 mg  Take 325 mg by mouth daily  Refills: 0        STOP taking    aspirin 81 MG EC tablet  Commonly known as: ASA              Where to get your medicines      These medications were sent to [place], [place] - [address]  [address] [facility], [facility] [address]    Phone: [phone]     acetaminophen 325 MG tablet    ibuprofen 600 MG tablet    senna-docusate 8.6-50 MG tablet            Consultations:   Anesthesia  NICU           Brief Admission History:   Osman Banks is a 32 year old  at 32w1d by LMP c/w 8w5d US, who is being transferred from [place] with PPROM. She states that she woke up this morning around 0400 to go to the bathroom and noticed a significant loss of blood-tinged fluid in the toilet afterwards. Afterwards, she started to notice feeling some contractions every 7-10 minutes that she described as \"uncomfortable\".      She arrived at an OSH in [facility] for evaluation. Amnisure was collected and was positive. She was started on BMZ and received her first dose on  at 0745. She was also started on latency antibiotics with clindamycin/azithromycin prior to transfer. GBS and GC/Chlam collected prior to transfer; results are pending. Currently, she states that she has had \"trickles\" of fluid since this morning. She feels that her fluid was initially more tinged with bright red blood and has now changed to brown, \"similar to a period\". Reports regular fetal movement x2. She has continued to have contractions while inpatient and feels that they are uncomfortable " more than her baseline [place] that she has been having.      Antepartum course   After admission, she was found to quickly progress to 4/80/-2 with irregular contractions and was transferred to L&D in anticipation of delivery. She was monitored for over 24 hours; her contractions spaced out and she was not found to make any additional cervical change, so she was transferred back to antepartum.     She was transitioned to clindamycin and azithromycin for latency antibiotics and transferred to the antepartum floor. She remained stable without signs of infection and had a 14d antepartum stay. Her twins were cephalic/cephalic and she desired a vaginal delivery. She was counseled regarding the risk of need for CS and consented for CS if indicated for fetal status. She was also counseled regarding breech extraction of the second twin and possible operative delivery with forceps and agreed to proceed with induction of labor and attempted vaginal delivery. She was transferred back to [facility] at 34w0d for IOL.        Intrapartum course   Her IOL began with pitocin. She was augmented with AROM of a forebag. She received an epidural for pain control and was GBS neg so did not require intrapartum antibiotics. Labor course was uncomplicated with reassuring fetal status throughout. After AROM she reported increased rectal pressure and was found to be 8 cm. She was transferred to the OR for delivery and progressed to complete without incident. She pushed for about 4 minutes, after which she had a spontaneous vaginal delivery of a viable male infant in LYNN position. No nuchal cord was noted. Apgars of 9 and 9 with weight of 2070 grams. The cord was double clamped after 60 seconds and cut. A cord segment and cord blood were obtained.       BSUS confirmed twin B remained in cephalic presentation. SVE after delivery of twin A was 8/90/-2 with twin B slightly ballotable. IV pitocin was continued for augmentation. AROM was performed  for moderate, clear fluid when fetal station was -1 and fetus no longer ballotable. The patient then progressed to complete and over the course of one contraction delivered a viable female infant in LYNN position. A nuchal cord was noted, delivered through, and then reduced. Apgars were 9 and 9 and weight was 2126 g.      40U of IV pitocin was started. The placentas was then delivered using gentle traction and suprapubic pressure. The uterus was noted to be firm after fundal massage. The perineum was assessed for lacerations and none were noted. Total QBL was 200 cc. A dose of UT misoprostol was given for prophylaxis due to multiple gestation. The placentas appeared intact with a 3V umbilical cord.         Postpartum Course   The patient's hospital course was unremarkable.  She recovered as anticipated and experienced no post-operative complications.  On discharge, her pain was well controlled. Vaginal bleeding is similar to peak menstrual flow.  Voiding without difficulty.  Ambulating well and tolerating a normal diet.  No fever or significant wound drainage.  Planning formula feeding.  Infants stable in NICU.  She was discharged on post-partum day #2.    Post-partum hemoglobin:   Hemoglobin   Date Value Ref Range Status   10/03/2022 10.2 (L) 11.7 - 15.7 g/dL Final             Discharge Instructions and Follow-Up:     Discharge diet: Regular   Discharge activity: Pelvic rest for 6 weeks including no sexual intercourse, tampons, or douching.   Discharge follow-up: Follow up with primary OB for routine postpartum visit in 6 weeks   Wound care: Keep incision clean and dry           Discharge Disposition:     Discharged to home        Pearl Lehman MD  ObGyn, PGY-3  10/04/2022 6:46 AM     [facility] staff:  Appreciate note by Dr. Lehman.  I have seen and examined the patient without the resident. I have reviewed, edited, and agree with the note.        Viki Dumont MD, FACOG

## 2022-09-20 NOTE — PROGRESS NOTES
9/20/2022 12:44 PM At bedside to make plan of care.  Patient is having pelvic pressure however reports she is able to sleep now without noticing contractions.  SVE 4/90/-1, unchanged from prior.  Epidural catheter is still in place but has not been infusing since rounds this AM.    cEFM review A baseline 130, + accels, - decels, moderate variability.  B baseline 140, + accels, - decels, moderate variability.  Overall reactive and reassuring for gestational age.  [place] with irregular contractions, none in last 20 min.    Will leave epidural catheter in situ this afternoon in the event of progression of labor.  Will move to antepartum at this time.  Continue cEFM.     Staffed with Dr. Ben Jalloh MD   Maternal-Fetal Medicine Fellow PGY6

## 2022-09-20 NOTE — PROGRESS NOTES
Brief Labor Progress Note    2022 9:46 PM  Patient now comfortable with epidural. Feeling a little bit more contraction pain through the epidural but bolus button helping for now.  SVE unchanged at /.  Vanc running for GBS unknown with severe PCN allergy.    cEFM review A baseline 130, + accels, - decels, moderate variability.  B baseline 135, + accels, no decels, moderate variability.  Overall reactive and reassuring for gestational age.  [place] q2-4 min regular contractions.    Continue expectant management for now for spontaneous  labor after SROM. Plan for delivery in the OR - will move once closer to complete dilation. Pt in agreement with the plan.    MD Lori Julien, PGY-3  2022 9:48 PM      Addendum:  Pt feeling more rectal pressure, requesting SVE. Unchanged, . FHT notable earlier for occasional variable decels, resolved with position changes. Overall cat 1, reactive and reassuring for both babies. Continue plan of care.    Pearl Abad MD  ObYung, PGY-3  2022 11:21 PM

## 2022-09-20 NOTE — PLAN OF CARE
Premature Rupture of Membranes  Data: VSS, Afebrile. Leaking small amounts of clear, brown-latonia fluid. Contraction pattern stable and within parameters. Fetal assessment Appropriate for Gestational Age. Signs and symptoms of infection absent.  Last BPP on  : A:, B: .  Latency antibiotic course not complete - 0/3.  Betamethasone Completed given on   &  .   Interventions: Monitor vital signs and indicators of infection every 4 hours while awake. Continue uterine/fetal assessment continuous EFM. Activity level: Up with assist. Preventive measures include Medications, Positioning, and Frequent voiding. Encourage active range of motion and frequent position changes. Epidural infusing continuous to keep line patent per MDA orders.   Plan: Continue expectant management. Observe for and notify care provider of indicators of progressing labor, signs/symptoms of infection, or fetal/maternal compromise.

## 2022-09-20 NOTE — PROGRESS NOTES
"MFM Antepartum Progress Note    Subjective:   Overnight, Osman became increasingly uncomfortable with contractions and on repeat SVE was found to be 4/90/-1 and she was transferred over to L&D. She had an epidural placed and then was able to sleep overnight as her contractions started to space out.     This morning, she is feeling \"tired\" but overall okay. She is hungry and wants to know if she will be able to eat breakfast. She continues to feel contractions which she describes as \"uncomfortable\". She feels like her contractions have started to become more frequent since she woke up this morning. Also notes some gas pains. Otherwise has no complaints.       Objective:  Vitals:    22 0731 22 0732 22 0800 22 0802   BP: 114/57 114/57  110/59   BP Location:       Patient Position:       Cuff Size:       Pulse:       Resp: 16      Temp: 98.9  F (37.2  C)      TempSrc: Oral      SpO2: 98%  96%    Weight:       Height:           I/O last 3 completed shifts:  In: 3060.42 [P.O.:1400; I.V.:660.42; IV Piggyback:1000]  Out: 1250 [Urine:1250]    Gen: Resting comfortably in bed, NAD  CV: Well perfused  Resp: Normal work of breathing   Abd: Gravid, non-distended     Comp US/BPP:   - Twin A: BPP 8/8, subjectively low fluid with MVP 2.3 cm, EFW 1817g 23%, cephalic presentation  - Twin B: BPP 8/8, normal fluid with MVP 4.5 cm, EFW  2099g 63%, cephalic presentation  - Twin growth discordance within normal limits      0600  FHT:  - Twin A: BL 140s, moderate variability, + accels, no decels   - Twin B: , moderte variability, + accels, no decels  [place]: irritable; one contraction in 10 minutes    Assessment  Ms. Osman Banks is a 32 year old  at 32w2d by LMP c/w 8w5d US, being admitted for PPROM on the morning of  in the setting of di-di twin gestation. She is s/p BMZ starting on  AM and received her second dose this morning. Overnight, was found to progress to  labor, " currently with an epidural in place. NSTs reassuring x2 now with irritability on toco but 1 contraction in 10 minutes. Discussed with patient that given her gestational age, would not plan to augment labor. Since her contractions have appeared to space out and are less painful this morning, we will plan to pause her epidural this morning and watch closely for signs of labor progression. Will try to avoid unnecessary SVE given increased risk of infection in the setting of PPROM but will repeat if clinically indicated. Will continue with vancomycin infusion for GBS prophylaxis for now and will reassess if she becomes more clinically stable.     # PPROM  # PTL   # Di-di twin gestation  - Amnisure positive 9/19 prior to transfer   - s/p day 1 of latency antibiotics with clindamycin/azithromycin; will hold while on L&D  - Will continue vancomycin for GBS prophylaxis in anticipation of delivery  - Ok to eat light breakfast this morning  - S/p BMZ, dose 2 given 9/20 0745  - UA culture, GBS, GC/CT pending   - s/p NICU consult   - Desires vaginal delivery; consented for C/S if indicated, including C/S for second twin if indicated. Planning BTL if requires C/S.    # Fetal Well Being  - US results as above; reassuring   - FHT reactive and reassuring x2    - plan for continuous monitoring while L&D    # Tobacco use in pregnancy   - Consider nicotine replacement while inpatient; patient currently declines    # Routine PNC   - GCT normal    - Rh negative, Rhogam 8/19, type and screen q72hrs   - s/p TdAP 9/2/22      Mel Nelson, MS4     Physician Attestation   I, Maritza Contreras MD, was present with the medical/CHLOE student who participated in the service and in the documentation of the note.  I have verified the history and personally performed the physical exam and medical decision making.  I agree with the assessment and plan of care as documented in the note.      I personally reviewed vital signs, medications, labs and  imaging.    Key findings: Transferred to L&D last night due to concern for  labor. Osman is comfortable with Epidural in place this am. Her contractions have decreased in frequency. Fetal status overall reassuring. Fetal growth appropriate for gestational age with normal intertwin discordance (see above), cephalic cephalic presentation.    Will plan to turn off Epidural to assess discomfort with contractions. Will monitor closely for signs and symptoms of infection given mild maternal tachycardia this morning. Plan to reassess after Epidural is turned off. Still desires vaginal delivery if feasible. Continue Vancomycin at this time.    Maritza Contreras MD  Date of Service (when I saw the patient): 22

## 2022-09-20 NOTE — PLAN OF CARE
Pt had SVE at 1850 by Dr Jalloh. SVE 4/90/-1. Pt agreeable to moving to L&D for epidural. Transfer via wheelchair at 1855. Report to RICHIE Masterson.

## 2022-09-20 NOTE — PHARMACY-VANCOMYCIN DOSING SERVICE
"Pharmacy Vancomycin Initial Note  Date of Service 2022  Patient's  1990  32 year old, female    Indication: Unknown GBS status, penicillin allergy    Current estimated CrCl = CrCl cannot be calculated (No successful lab value found.).    Creatinine for last 3 days  No results found for requested labs within last 72 hours.    Recent Vancomycin Level(s) for last 3 days  No results found for requested labs within last 72 hours.      Vancomycin IV Administrations (past 72 hours)                   vancomycin 1250 mg in 0.9% NaCl 250 mL intermittent infusion 1,250 mg (mg) 1,250 mg New Bag 22 1950                Nephrotoxins and other renal medications (From now, onward)    Start     Dose/Rate Route Frequency Ordered Stop    22 1930  vancomycin 1250 mg in 0.9% NaCl 250 mL intermittent infusion 1,250 mg         15 mg/kg × 87.5 kg  over 90 Minutes Intravenous ONCE 22 19022 191  vancomycin place disla - receiving intermittent dosing         1 each Intravenous SEE ADMIN INSTRUCTIONS 22 19122 1850  ketorolac (TORADOL) injection 30 mg        \"Or\" Linked Group Details    30 mg Intravenous ONCE PRN 22 18522 18422 1850  ketorolac (TORADOL) injection 30 mg        \"Or\" Linked Group Details    30 mg Intramuscular ONCE PRN 22 18522 18422 1850  ibuprofen (ADVIL/MOTRIN) tablet 800 mg        \"Or\" Linked Group Details    800 mg Oral ONCE PRN 22 18522 1849          Contrast Orders - past 72 hours (72h ago, onward)    None          [employer] Prediction of Planned Initial Vancomycin Regimen  Unable to determine, creatinine not available at this time         Plan:  1. Start vancomycin  15 mg/kg mg IV q8h.   2. Vancomycin monitoring method: AUC  3. Vancomycin therapeutic monitoring goal: 400-600 mg*h/L  4. Pharmacy will check vancomycin levels as appropriate in 1-3 Days.  Will re-assess dose when creatinine " is available to perform AUC predictions   5. Serum creatinine levels will be ordered a minimum of twice weekly.      Katalina Gallagher [facility]

## 2022-09-20 NOTE — PROVIDER NOTIFICATION
09/20/22 0521   Provider Notification   Provider Name/Title MD Abad G3   Method of Notification Electronic Page   Request Evaluate - Remote   Notification Reason Status Update;Patient Request     RN notified MD of PT's hypotensive episode. Resolved after 1 dose of phenylephrine but PT reporting dizziness persists. PT requesting to know when MD is planning for next cervical check. PT reports intermittent vaginal pressure.    MD called back to discuss plan of care. Plans to round with PT soon.  RN will update PT.

## 2022-09-21 LAB
BACTERIA UR CULT: NORMAL
CREAT SERPL-MCNC: 0.45 MG/DL (ref 0.52–1.04)
GFR SERPL CREATININE-BSD FRML MDRD: >90 ML/MIN/1.73M2
HOLD SPECIMEN: NORMAL
VANCOMYCIN SERPL-MCNC: 2.6 MG/L

## 2022-09-21 PROCEDURE — 99232 SBSQ HOSP IP/OBS MODERATE 35: CPT | Mod: 25 | Performed by: OBSTETRICS & GYNECOLOGY

## 2022-09-21 PROCEDURE — 250N000013 HC RX MED GY IP 250 OP 250 PS 637: Performed by: STUDENT IN AN ORGANIZED HEALTH CARE EDUCATION/TRAINING PROGRAM

## 2022-09-21 PROCEDURE — 120N000002 HC R&B MED SURG/OB UMMC

## 2022-09-21 PROCEDURE — 258N000003 HC RX IP 258 OP 636: Performed by: STUDENT IN AN ORGANIZED HEALTH CARE EDUCATION/TRAINING PROGRAM

## 2022-09-21 PROCEDURE — 250N000011 HC RX IP 250 OP 636: Performed by: STUDENT IN AN ORGANIZED HEALTH CARE EDUCATION/TRAINING PROGRAM

## 2022-09-21 PROCEDURE — 59025 FETAL NON-STRESS TEST: CPT | Mod: 26 | Performed by: OBSTETRICS & GYNECOLOGY

## 2022-09-21 PROCEDURE — 82728 ASSAY OF FERRITIN: CPT | Performed by: STUDENT IN AN ORGANIZED HEALTH CARE EDUCATION/TRAINING PROGRAM

## 2022-09-21 PROCEDURE — 80202 ASSAY OF VANCOMYCIN: CPT | Performed by: OBSTETRICS & GYNECOLOGY

## 2022-09-21 PROCEDURE — 82565 ASSAY OF CREATININE: CPT | Performed by: OBSTETRICS & GYNECOLOGY

## 2022-09-21 PROCEDURE — 36415 COLL VENOUS BLD VENIPUNCTURE: CPT | Performed by: OBSTETRICS & GYNECOLOGY

## 2022-09-21 RX ADMIN — PRENATAL VITAMINS-IRON FUMARATE 27 MG IRON-FOLIC ACID 0.8 MG TABLET 1 TABLET: at 08:03

## 2022-09-21 RX ADMIN — DOCUSATE SODIUM 100 MG: 100 CAPSULE, LIQUID FILLED ORAL at 08:03

## 2022-09-21 RX ADMIN — FAMOTIDINE 10 MG: 10 TABLET ORAL at 08:03

## 2022-09-21 RX ADMIN — CLINDAMYCIN PHOSPHATE 900 MG: 900 INJECTION, SOLUTION INTRAVENOUS at 04:28

## 2022-09-21 RX ADMIN — CLINDAMYCIN PHOSPHATE 900 MG: 900 INJECTION, SOLUTION INTRAVENOUS at 12:35

## 2022-09-21 RX ADMIN — DOCUSATE SODIUM 100 MG: 100 CAPSULE, LIQUID FILLED ORAL at 20:13

## 2022-09-21 RX ADMIN — CALCIUM CARBONATE (ANTACID) CHEW TAB 500 MG 500 MG: 500 CHEW TAB at 18:35

## 2022-09-21 RX ADMIN — FAMOTIDINE 10 MG: 10 TABLET ORAL at 20:13

## 2022-09-21 RX ADMIN — SODIUM CHLORIDE, POTASSIUM CHLORIDE, SODIUM LACTATE AND CALCIUM CHLORIDE: 600; 310; 30; 20 INJECTION, SOLUTION INTRAVENOUS at 12:34

## 2022-09-21 RX ADMIN — CLINDAMYCIN HYDROCHLORIDE 450 MG: 300 CAPSULE ORAL at 20:13

## 2022-09-21 RX ADMIN — SODIUM CHLORIDE, POTASSIUM CHLORIDE, SODIUM LACTATE AND CALCIUM CHLORIDE: 600; 310; 30; 20 INJECTION, SOLUTION INTRAVENOUS at 03:56

## 2022-09-21 ASSESSMENT — ACTIVITIES OF DAILY LIVING (ADL)
ADLS_ACUITY_SCORE: 20

## 2022-09-21 NOTE — PLAN OF CARE
Goal Outcome Evaluation:    Plan of Care Reviewed With: patient, spouse     Overall Patient Progress: improving    Outcome Evaluation: Pt was able to rest in between VS throughout night. Pt reports increase in contraction frequency and cramping.    Pt admitted for PPROM of twin A and contractions. RN assumed care at 1900. VSS, EFM charted (see flowsheet), pt afebrile. Pt denies headache, vision changes, RUQ pain, bleeding. Pt resting in bed with support person, Ernesto, at bedside. Current plan per provider is expectant management. Pt stated she has no questions or concerns with plan of care at this time. Call light within reach. Report given to Maureen QUIROZ.

## 2022-09-21 NOTE — PROVIDER NOTIFICATION
09/20/22 3696   Provider Notification   Provider Name/Title Dr. Abad   Method of Notification Electronic Page   Request Evaluate in Person;Evaluate - Remote   Notification Reason Status Update   FYI, pt reporting worsening contractions q 3-5 min and increased bloody show.

## 2022-09-21 NOTE — PROGRESS NOTES
"Labor Progress Note     S: Feeling like contractions have picked up in frequency over the last hour or so. Throughout the day they have been every 10-15 minutes, but now feel like every 3-5 minutes. Also have picked up in intensity. Wondering when the right time would be to turn the epidural dose back up. Also reports more brown/bloody mucus discharge. Pelvic pressure is intermittent and the same as before.    O:  /65 (BP Location: Right arm, Patient Position: Semi-Steven's, Cuff Size: Adult Regular)   Pulse 93   Temp 98.4  F (36.9  C) (Oral)   Resp 16   Ht 1.676 m (5' 6\")   Wt 87.5 kg (193 lb)   SpO2 99%   BMI 31.15 kg/m    General: NAD  VE: deferred    FHT: A - Baseline 120, moderate variability, accelerations present, no decelerations          B - Baseline 140, moderate variability, accelerations present, no decelerations  TOCO: not tracing ctx well    A/P: 32 year old  at 32w2d, here for SROM and spontaneous  labor with di-di twin gestation. Initially progressed from FT to 4cm yesterday, and has remained stable without labor progression since last night after contractions spaced out. Epidural dose turned down to ambulatory level earlier today. As previously discussed, will continue expectant management. Do not feel SVE indicated at this time, but discussed if contractions  to the point of requesting epidural back on or if pelvic pressure increases notably, will check at that time. Continue latency antibiotics, but if labor progression, can discontinue. GBS returned negative so no ppx indicated for that. FHT category I for both twins, reactive and reassuring. Continue to monitor.    Pearl Abad MD  ObGyn, PGY-3  2022 11:11 PM      "

## 2022-09-21 NOTE — PROGRESS NOTES
[facility] Antepartum Progress Note    Subjective:   Osman is feeling okay this morning. Had some increasing contractions but feels that they have spaced out somewhat this morning. She notes increased mucinous discharge and does have some dark red-brown tinge to it; overall mostly similar to when she was admitted. She is hoping to go outside today if possible. Reports regular fetal movement x2. Denies fevers or abdominal pain.     Objective:  Vitals:    22 0554 22 0746 22 0750 22 0900   BP:   112/67    BP Location:       Patient Position:       Cuff Size:       Pulse:       Resp:    16   Temp:    98.2  F (36.8  C)   TempSrc:    Oral   SpO2: 97% 100%  99%   Weight:       Height:         I/O last 3 completed shifts:  In: 5179.17 [P.O.:3800; I.V.:1379.17]  Out: 1850 [Urine:1850]    Gen: Resting comfortably in bed, NAD  CV: Well perfused  Resp: Normal work of breathing   Abd: Gravid, non-tender, non-distended    SVE; /-2, unchanged from prior       FHT:    - Twin A: baseline 125, moderate variability, accels present, no decels  - Twin B: baseline 140, minimal to moderate variability, accels present, no decels  [place]: rare contraction, some irritability     Assessment  Osman Banks is a 32 year old  at 32w3d by LMP c/w 8w5d US who is HD#3 PPROM on the morning of  in the setting of di-di twin gestation. She was initially admitted to antepartum, was found to progress to -1 and was transferred to L&D. She has been stable over the past day with intermittent contractions. Repeat cervical check this morning was unchanged. Currently stable and will be transferred back to antepartum with close monitoring. No signs of intra-amniotic infection at this time. Will try to avoid unnecessary SVE given increased risk of infection in the setting of PPROM but will repeat if clinically indicated.      # PPROM  # PTL   # Di-di twin gestation  - Amnisure positive  prior to transfer   -  D#3 of latency antibiotics with clindamycin (complete azithromycin on day of admission)  - GBS negative as of ; vancomycin discontinued   - S/p BMZ from -  - UA culture, GC/CT pending  - s/p NICU consult   - Desires vaginal delivery; consented for C/S if indicated, including C/S for second twin if indicated. Planning BTL if requires C/S  - Plan to remove epidural catheter this am     # Fetal Well Being  - FHT reactive and reassuring x2   - TID EFM while on antepartum      # Tobacco use in pregnancy   - Consider nicotine replacement while inpatient; patient currently declines     # Routine PNC   - GCT normal    - Rh negative, Rhogam , type and screen q72hrs   - s/p TdAP 22  - Ok to go outside in wheelchair with RN chaperone if FHT stable this morning     Mel Nelson, MS4    I was present with the medical student who participated in the service and in the documentation of this note.  I have verified the history and personally performed the physical exam and medical decision making, and have verified the content of the note, which accurately reflect my assessment of the patient and the plan of care.     Mary Ballard MD  OBGYN PGY-3  12:03 PM 2022      Physician Attestation   I saw this patient with the resident and agree with the resident/fellow's findings and plan of care as documented in the note.      I personally reviewed vital signs, medications, labs and imaging.    Key findings: No further progression of  labor with stable cervical exam. Plan to remove epidural catheter, transfer back to antepartum [place], and perform TID fetal monitoring. Continue clindamycin for latency antibiotics. Delivery indicated if non-reassuring fetal status, infection, labor or at 34 weeks gestation.    Maritza Contreras MD  Date of Service (when I saw the patient): 22

## 2022-09-21 NOTE — PLAN OF CARE
Premature Rupture of Membranes  Data: VSS, Afebrile. Leaking scant  amounts of clear, brown-latonia fluid. Contraction pattern stable and within parameters. Fetal assessment Appropriate for Gestational Age. Signs and symptoms of infection absent.  Last BPP on  : A:, B: . Latency antibiotic course not complete.  Betamethasone Completed given on   &  .   Interventions: Monitor vital signs and indicators of infection every 4 hours while awake. Continue uterine/fetal assessment 3 times daily. Activity level: Advance activity as tolerated. Preventive measures include Medications, Positioning, and Frequent voiding. Encourage active range of motion and frequent position changes.  Plan: Continue expectant management. Observe for and notify care provider of indicators of progressing labor, signs/symptoms of infection, or fetal/maternal compromise.

## 2022-09-22 PROCEDURE — 99231 SBSQ HOSP IP/OBS SF/LOW 25: CPT | Mod: 25 | Performed by: OBSTETRICS & GYNECOLOGY

## 2022-09-22 PROCEDURE — 59025 FETAL NON-STRESS TEST: CPT | Mod: 26 | Performed by: OBSTETRICS & GYNECOLOGY

## 2022-09-22 PROCEDURE — 250N000013 HC RX MED GY IP 250 OP 250 PS 637: Performed by: STUDENT IN AN ORGANIZED HEALTH CARE EDUCATION/TRAINING PROGRAM

## 2022-09-22 PROCEDURE — 120N000002 HC R&B MED SURG/OB UMMC

## 2022-09-22 RX ADMIN — ACETAMINOPHEN 650 MG: 325 TABLET, FILM COATED ORAL at 00:42

## 2022-09-22 RX ADMIN — CLINDAMYCIN HYDROCHLORIDE 450 MG: 300 CAPSULE ORAL at 13:37

## 2022-09-22 RX ADMIN — CLINDAMYCIN HYDROCHLORIDE 450 MG: 300 CAPSULE ORAL at 04:13

## 2022-09-22 RX ADMIN — CALCIUM CARBONATE (ANTACID) CHEW TAB 500 MG 500 MG: 500 CHEW TAB at 20:56

## 2022-09-22 RX ADMIN — POLYETHYLENE GLYCOL 3350 17 G: 17 POWDER, FOR SOLUTION ORAL at 09:00

## 2022-09-22 RX ADMIN — FAMOTIDINE 10 MG: 10 TABLET ORAL at 20:26

## 2022-09-22 RX ADMIN — CLINDAMYCIN HYDROCHLORIDE 450 MG: 300 CAPSULE ORAL at 20:26

## 2022-09-22 RX ADMIN — PRENATAL VITAMINS-IRON FUMARATE 27 MG IRON-FOLIC ACID 0.8 MG TABLET 1 TABLET: at 09:00

## 2022-09-22 RX ADMIN — FAMOTIDINE 10 MG: 10 TABLET ORAL at 09:00

## 2022-09-22 RX ADMIN — DOCUSATE SODIUM 100 MG: 100 CAPSULE, LIQUID FILLED ORAL at 09:00

## 2022-09-22 ASSESSMENT — ACTIVITIES OF DAILY LIVING (ADL)
ADLS_ACUITY_SCORE: 18
ADLS_ACUITY_SCORE: 20
ADLS_ACUITY_SCORE: 18
ADLS_ACUITY_SCORE: 20
ADLS_ACUITY_SCORE: 18
ADLS_ACUITY_SCORE: 20
ADLS_ACUITY_SCORE: 20

## 2022-09-22 NOTE — PROGRESS NOTES
Brief OBGYN Progress Note:    Notified by RN of patient c/o cramping. In to evaluate. Pt reports cramping that woke her up from sleep about an hour ago. Overall feels about the same intensity as before, which has been fairly consistent since admission - noticeable but not intolerable. Also reports feeling more pelvic pressure now, like baby's head is lower or she has to have a bowel movement. Does note she is constipated and hasn't passed a BM since Sunday. Took some stool softeners today. On exam, vital signs stable, pt afebrile. overall appears comfortable. SVE performed, unchanged at 4/80/-2. FHT obtained, both babies with normal baseline, mod variability. TOCO not picking up ctx. Palpated cramping, felt mild. At this time, reassured by stable cervical exam. Offered tylenol, vistaril, heat packs to help with sleep. Pt accepts tylenol for now. Knows to alert team if cramping/contractions increase.    Pearl Abad MD  ObGyn, PGY-3  09/22/2022 12:43 AM

## 2022-09-22 NOTE — PROGRESS NOTES
[facility] Antepartum Progress Note    Subjective:   Osman is feeling okay this morning. Overnight, she became more uncomfortable with cramping that has improved. Has noticed increased leakage of fluid and mild swelling in her feet. She also notes some numbness in her lower back and wonders if this is related to her epidural; still is able to walk on her own without any difficulty. Has questions about possibly being transferred  to home.   Denies fevers, vaginal bleeding, or abdominal pain.     Objective:  Vitals:    22 1526 22 1933 22 2332 22 0415   BP: 112/60 115/69 112/60 103/57   BP Location: Left arm Left arm Left arm    Patient Position: Sitting Sitting Semi-Steven's    Cuff Size: Adult Regular Adult Regular Adult Regular    Pulse:       Resp:  16 16 16   Temp:  98.2  F (36.8  C) 97.8  F (36.6  C)    TempSrc:  Oral Oral    SpO2:       Weight:       Height:           I/O last 3 completed shifts:  In: 2371 [P.O.:1900; I.V.:471]  Out: -     Gen: Resting comfortably in bed, NAD  CV: Well perfused  Resp: Normal work of breathing   Abd: Gravid, non-tender, non-distended      FHT:  - Twin A: , moderate variability, + accels, no decels  - Twin B: , moderate variability, + accels, occasional small variable decels  [place]: quiet    Assessment  Osman Banks is a 32 year old  at 32w4d by LMP c/w 8w5d US who is HD#4 PPROM on the morning of  in the setting of di-di twin gestation. She was initially admitted and was found to progress quickly to /-1; has since been stable without additional cervical change. No signs of intra-amniotic infection at this time. Will try to avoid unnecessary SVE given increased risk of infection in the setting of PPROM but will repeat if clinically indicated.     Discussed limitations with regards to transfer given her current clinical state and gestational age; advised that she remained admitted here until delivery.      #  PPROM  # PTL   # Di-di twin gestation  - Amnisure positive 9/19 prior to transfer   - D#4 of latency antibiotics with clindamycin (completed azithromycin on day of admission)  - GBS negative as of 9/20 ([facility] and [facility]); vancomycin discontinued   - S/p BMZ from 9/19-9/20  - UA culture negative, GC/CT negative from OSH  - s/p NICU consult   - Desires vaginal delivery; consented for C/S if indicated, including C/S for second twin if indicated. - Planning BTL if requires C/S    # Low back paresthesia   - S/p epidural placement; removed yesterday   - Ambulating independently; no radicular pain   - Will discuss with anesthesia team regarding symptoms       # Fetal Well Being  - FHT reactive and reassuring x2   - TID EFM while on antepartum   - Twice weekly BPP (scheduled next 9/23)     # Tobacco use in pregnancy   - Consider nicotine replacement while inpatient; patient currently declines     # Routine PNC   - GCT normal    - Rh negative, Rhogam 8/19, type and screen q72hrs   - S/p TdAP 9/2/22  - Ok to go outside in wheelchair with chaperone     Mel Nelson, MS4    Physician Attestation   I, Maritza Contreras MD, was present with the medical/CHLOE student who participated in the service and in the documentation of the note.  I have verified the history and personally performed the physical exam and medical decision making.  I agree with the assessment and plan of care as documented in the note.      I personally reviewed vital signs, medications, labs and imaging.    Key findings: Doing well today. No signs or symptoms of infection or labor at this time. Plan to continue latency antibiotics. Avoid any further cervical exams unless change in clinical status and concern again for labor. Continues to desire vaginal delivery at [facility].     Maritza Contreras MD  Date of Service (when I saw the patient): 09/22/22

## 2022-09-22 NOTE — PROGRESS NOTES
09/22/22 1417   Child Life   Location (Antepartum)   Intervention Initial Assessment;Family Support;Referral/Consult;Sibling Support   Family Support Comment Brief introduction to self and services provided at bedside. Patient verbalized interest in consult and support.   Sibling Support Comment 2.5y son, currently at home in [place]. Patient identifies separation as a stressor related to hospitalization. Interest in ongoing support.   Outcomes/Follow Up Continue to Follow/Support  (ASCOM:*[id number])

## 2022-09-22 NOTE — CONSULTS
[facility]  MATERNAL CHILD HEALTH   INITIAL PSYCHOSOCIAL ASSESSMENT     DATA:     Presenting Information: Pt is a 32 year old  admitted for PTL and PRROM. SW was consulted for antepartum assessment.    Living Situation: Parents are not  and live together in [place], [place] in [facility], along with their 2.5 year old son Junaid.    Social Support: Osman reports they have family and friends who can help with  but they have dogs who require regular feeding and cares and it has been hard to find help with the dogs.    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  Ernesto is not currently employed.    Insurance: [employer]    Source of Financial Support: parental employment    Mental Health History: no    History of Postpartum Mood Disorders: no    Chemical Health History: no    Legal/Child Protection Involvement: no    INTERVENTION:       Chart review    Collaboration with team    Conducted Psychosocial Assessment    Introduction to Maternal Child Health SW role and scope of practice    Orientation to the NICU (parking, lodging, meals, visitation)    Validated emotions and provided supportive listening    Provided psychoeducation on  mood disorders and indicated that SW would continue to monitor mood and support bridging to mental health resources as needed.    Provided resources and referrals    [place] [address] (will need further parking assistance when Ernesto comes for delivery)    Provided SW contact info    ASSESSMENT:     Coping: sOman reports she is trying settle in and accept she is where she needs to be but it is hard to be away from her son.  She also reports significant stress with being so far from home if the babies come urgently.  She is worried Ernesto may miss their birth especially if it happens in the middle of the night.  They are working to find care for their dogs she Ernesto can come and  stay.  Osman also is thinking a lot about the possibility of transferring back to [place] if she were to make it to 34 weeks.  She reports it will be very difficult to be so far from home for any extended period but understands she and the babies may need to be here.  We discussed lodging options close to the hospital after babies are born.  Osman also reports she has too much time to think about everything so we talked about the family resource center as a diversion.    Assessment of parental risk for PMAD: Average risk    Current stressors, barriers, family concerns: separation from two year old, distance from home, no care for their dogs    Risk Factors: none    Resiliency Factors & Strengths: inner strength, good advocacy skills    PLAN:     SW will continue to follow for supportive intervention.    Deepa ATWOODW, MSW, LICSW  Maternal Child Health

## 2022-09-22 NOTE — PLAN OF CARE
Goal Outcome Evaluation:      Patient VSS and afebrile. Patient denies any s/s of PTL, infection or change in condition. Patient FHR tracing cat 1 twin A, cat 1 twin B occasional irritability noted on monitor, see flowsheet for more details. Patient notified when to call out to RN, verbalized understanding and agreed to plan. Will proceed with ongoing assessment.

## 2022-09-22 NOTE — PLAN OF CARE
Pt VSS, afebrile. Pt leaking scant amount of clear-brownish fluid. Pt denies vaginal bleeding. Pt complained of cramping overnight, MD to assess pt. Cervix unchanged SVE 4/80/-2. See flowsheet for EFM and toco. Educated pt to alert team with any cramping, contractions or changes in discharge. Pt is agreeable to plan of care at this time.

## 2022-09-22 NOTE — PROVIDER NOTIFICATION
09/22/22 1015   Provider Notification   Provider Name/Title Shubham Contreras Josephson   Method of Notification At Bedside   Request Evaluate in Person   Notification Reason Status Update   Providers at bedside to evaluate in person. Plan for expectant management at the [facility] campus until following delivery and possibility of infant transfer to [facility] when and if appropriate. Plan for IOL at 34 weeks discussed. Pt ok for wheelchair rides per MD. Patient questions asked and answered. Will proceed with ongoing assessment.

## 2022-09-22 NOTE — PROVIDER NOTIFICATION
09/21/22 8138   Provider Notification   Provider Name/Title Dr. Abad   Method of Notification Electronic Page   Request Evaluate - Remote   Notification Reason Uterine Activity   FYI- Pt cramping toco placed. no contractions noted at this time.     Addemdum: SVE-4/80/-2, unchanged per provider. Per provider pt can come off the monitor. Will update provider with any changes in cramping/solomon.

## 2022-09-23 ENCOUNTER — APPOINTMENT (OUTPATIENT)
Dept: ULTRASOUND IMAGING | Facility: CLINIC | Age: 32
End: 2022-09-23
Payer: COMMERCIAL

## 2022-09-23 LAB
ABO/RH(D): ABNORMAL
ANTIBODY ID: NORMAL
ANTIBODY SCREEN: POSITIVE
SPECIMEN EXPIRATION DATE: ABNORMAL
SPECIMEN EXPIRATION DATE: NORMAL

## 2022-09-23 PROCEDURE — 99231 SBSQ HOSP IP/OBS SF/LOW 25: CPT | Mod: 25 | Performed by: OBSTETRICS & GYNECOLOGY

## 2022-09-23 PROCEDURE — 86850 RBC ANTIBODY SCREEN: CPT | Performed by: STUDENT IN AN ORGANIZED HEALTH CARE EDUCATION/TRAINING PROGRAM

## 2022-09-23 PROCEDURE — 86870 RBC ANTIBODY IDENTIFICATION: CPT | Performed by: STUDENT IN AN ORGANIZED HEALTH CARE EDUCATION/TRAINING PROGRAM

## 2022-09-23 PROCEDURE — 76819 FETAL BIOPHYS PROFIL W/O NST: CPT | Mod: 59

## 2022-09-23 PROCEDURE — 250N000013 HC RX MED GY IP 250 OP 250 PS 637: Performed by: STUDENT IN AN ORGANIZED HEALTH CARE EDUCATION/TRAINING PROGRAM

## 2022-09-23 PROCEDURE — 76818 FETAL BIOPHYS PROFILE W/NST: CPT | Mod: 26 | Performed by: OBSTETRICS & GYNECOLOGY

## 2022-09-23 PROCEDURE — 36415 COLL VENOUS BLD VENIPUNCTURE: CPT | Performed by: STUDENT IN AN ORGANIZED HEALTH CARE EDUCATION/TRAINING PROGRAM

## 2022-09-23 PROCEDURE — 120N000002 HC R&B MED SURG/OB UMMC

## 2022-09-23 PROCEDURE — 86901 BLOOD TYPING SEROLOGIC RH(D): CPT | Performed by: STUDENT IN AN ORGANIZED HEALTH CARE EDUCATION/TRAINING PROGRAM

## 2022-09-23 RX ADMIN — DOCUSATE SODIUM 100 MG: 100 CAPSULE, LIQUID FILLED ORAL at 08:20

## 2022-09-23 RX ADMIN — FAMOTIDINE 10 MG: 10 TABLET ORAL at 20:10

## 2022-09-23 RX ADMIN — POLYETHYLENE GLYCOL 3350 17 G: 17 POWDER, FOR SOLUTION ORAL at 09:34

## 2022-09-23 RX ADMIN — PRENATAL VITAMINS-IRON FUMARATE 27 MG IRON-FOLIC ACID 0.8 MG TABLET 1 TABLET: at 08:20

## 2022-09-23 RX ADMIN — CLINDAMYCIN HYDROCHLORIDE 450 MG: 300 CAPSULE ORAL at 04:00

## 2022-09-23 RX ADMIN — FAMOTIDINE 10 MG: 10 TABLET ORAL at 08:20

## 2022-09-23 RX ADMIN — CLINDAMYCIN HYDROCHLORIDE 450 MG: 300 CAPSULE ORAL at 12:11

## 2022-09-23 RX ADMIN — CLINDAMYCIN HYDROCHLORIDE 450 MG: 300 CAPSULE ORAL at 20:10

## 2022-09-23 ASSESSMENT — ACTIVITIES OF DAILY LIVING (ADL)
ADLS_ACUITY_SCORE: 18

## 2022-09-23 NOTE — PROGRESS NOTES
Acupunture Clinical Internship Intake and Treatment Documentation   [facility]    Date:  2022  Patient Name:  Osman Banks   YOB: 1990     Repeat Patient:  no  Has patient had acupoint/acupressure treatment before:  yes    Signed consent placed in the medical record:  yes  Patient/Family verbalizes understanding of risks and benefits:  yes  Required information provided to patient:  yes    Diagnosis:   premature rupture of membranes in third trimester [O42.913]    Patient condition and treatment:  Low Back Pain due to pregnancy  Reason for Intervention Today/Chief Complaint:  Low Back Pain    Isolation:  No  Type:  None    PRE-SCORE:  mild    Other [facility] medical information:  NA    Medications   Current Facility-Administered Medications:     acetaminophen (TYLENOL) tablet 650 mg, 650 mg, Oral, Q4H PRN, Venus Jalloh MD, 650 mg at 22 0042    calcium carbonate (TUMS) chewable tablet 500 mg, 500 mg, Oral, Daily PRN, Venus Jalloh MD, 500 mg at 22    clindamycin (CLEOCIN) capsule 450 mg, 450 mg, Oral, Q8H GERMAINE, Pearl Abad MD, 450 mg at 22 1211    diphenhydrAMINE (BENADRYL) capsule 25 mg, 25 mg, Oral, Q6H PRN **OR** diphenhydrAMINE (BENADRYL) injection 25 mg, 25 mg, Intravenous, Q6H PRN, Venus Jalloh MD    docusate sodium (COLACE) capsule 100 mg, 100 mg, Oral, BID, Venus Jalloh MD, 100 mg at 22 0820    famotidine (PEPCID) tablet 10 mg, 10 mg, Oral, BID, Venus Jalloh MD, 10 mg at 22 0820    metoclopramide (REGLAN) injection 10 mg, 10 mg, Intravenous, Q6H PRN **OR** metoclopramide (REGLAN) tablet 10 mg, 10 mg, Oral, Q6H PRN, Venus Jalloh MD    No Tdap Needed - Assessment: Patient does not need Tdap vaccine, , Does not apply, Continuous PRN, Venus Jalloh MD    ondansetron (ZOFRAN ODT) ODT tab 4 mg, 4 mg, Oral, Q6H PRN **OR** ondansetron (ZOFRAN) injection 4 mg, 4 mg,  "Intravenous, Q6H PRN, Venus Jalloh MD    polyethylene glycol (MIRALAX) Packet 17 g, 17 g, Oral, Daily, Venus Jalloh MD, 17 g at 09/23/22 0934    prenatal multivitamin w/iron per tablet 1 tablet, 1 tablet, Oral, Daily, Venus Jalloh MD, 1 tablet at 09/23/22 0820    prochlorperazine (COMPAZINE) injection 10 mg, 10 mg, Intravenous, Q6H PRN **OR** prochlorperazine (COMPAZINE) tablet 10 mg, 10 mg, Oral, Q6H PRN **OR** prochlorperazine (COMPAZINE) suppository 25 mg, 25 mg, Rectal, Q12H PRN, Venus Jalloh MD    sodium chloride (PF) 0.9% PF flush 3 mL, 3 mL, Intracatheter, Q8H, Maritza Contreras MD, 3 mL at 09/23/22 0820  No current outpatient medications on file.       Pre-Treatment Assessment  Chief Complaint/ Reason for Intervention Today:  Low Back Pain  Chief Complaint Pre-Score:  mild   Describe:  Achy, dull, epidural given on Monday and still slightly numb  Pain Location:  Entire low back  Pre Session Pain:  Mild  Pre Session Anxiety:  None  Pre Session Nausea:  None    10 Traditional Chinese Medicine Assessment Questions  - Cold/ Heat:  Neutral temperature, dry nose  - Sweat:  No sweat  - Headaches/Body aches:  Low back pain that is achy, dull achy calves, current frontal/temporal headache that is mild dull and achy  - Chest/Abdomen:  No chest oppression, pain or palpitations, no rib side pain  - Digestion: Patient states she has \"terrible heart burn\"  - Bowel Movement/Urination: I=O, slightly increased urination due to pregnancy, diarrhea this morning  - Hearing/Vision:  No changes to vision or hearing   - Sleep (prior to hospital):  Poor, interrupted sleep caused by pregnancy, averages 4 hrs/night, no dreams, does not wake up rested  - Energy:  Moderate fatigue, low energy  - Emotions:  Patient states she is not experiencing any notable stress or anxiety  - Ob Gyn:  Pregnant, 3rd trimester  - Miscellaneous: NA    Traditional Chinese Medicine Assessment  - TONGUE:  Pink body, purple in " Liver and Gallbladder, glossy thin white coat, red tip  - PULSE:  Slippery, wiry in cun  - OBSERVATIONS:  NA    Traditional Chinese Medicine Diagnosis  - BRANCH:  Channel Obstruction in UB Channel causing low back pain  - ROOT:  Spleen Qi Deficiency and Liver Qi Stagnation    Traditional Chinese Medicine Treatment  - ACUPUNCTURE:  Yin marinelli, dexter tong hilary(Left), Du 20, Ht 7(Left), SI 3 (Left), Haydee 7(Left), GB 40, UB 62, Sommer Men, Lumbar, Heart, Spleen  - NEEDLE COUNT: In: 19   Out: 19    Time In: 2:15     Magnet informed consent signed and given:  no    Post Treatment Assessment  Chief complaint post score:  better  Post Session Observation:  Patient mentioned she felt rested  Patient/Family Education:  NA  Verbal information provided:  yes  Written information provided:  Yes, signed informed consent  All questions answered at time of treatment:  yes    Treatment/Procedure(s) performed by:  Venus Leon    Date: 9/23/2022     I attest that this acupuncture treatment was done under my supervision and this note is complete and true.     Supervising acupuncturist:    Emery Arroyo LAc [facility]    [facility] Faculty    [facility] license #: [id number]  P: [phone]

## 2022-09-23 NOTE — PROGRESS NOTES
"   09/23/22 1220   Child Life   Location   (Antepartum)   Intervention Family Support;Initial Assessment   Family Support Comment Re-introduction to self and services.     Patient provided global updates to plan of care and anticipation of hospitalization, sharing, \"Really taking it hour by hour\".     She identified stressors appropriate to length of stay, separation from 2.5y son \"Junaid\" and juggling the needs of her pets at home. CCLS discussed age-appropriate ways to support connection sharing information re: Family Resource Center. Patient and  had specific interest in choosing some books to read aloud during their interactions with 2.5y; additionally supporting the transition off of the video call.     Patient identified these transitions as \"getting harder\" for both her, and her son. CCLS praise their intentionally to support maintaining Junaid's routine and consistency in caregivers, as well as the thoughtful ways they hope to help him understand and cope with this admission.     Family welcomes continued check-ins and ongoing support.   Outcomes/Follow Up Continue to Follow/Support  (ASCOM: *[phone] , M-F [phone])     "

## 2022-09-23 NOTE — PLAN OF CARE
Goal Outcome Evaluation:  Pt states is uncomfortable during ctx's 5-7 minutes apart early this evening (SEE flow sheet) . Babies A and B reactive on monitor. Dr Abad notified. Now pt has occass ctx's. Sleeping. Saline locked flushed. Leaking scant clear fluid. Denies pressure or bleeding.  at bedside.

## 2022-09-23 NOTE — PROGRESS NOTES
Brief Progress Note    Notified by RN that patient returned from visit with  outside complaining of constant rectal pressure and sensation that she needed to have a BM. Has continued to have intermittent contractions throughout her admission, starting this afternoon has been having more shooting pains down into groin. Continues to have leaking, no fevers, chills.     Vitals:    09/22/22 2100 09/23/22 0400 09/23/22 0807 09/23/22 1319   BP: 121/70 118/69 117/63 122/73   BP Location:       Patient Position:       Cuff Size:       Pulse:       Resp:  18 16 17   Temp:  98.1  F (36.7  C) 98.7  F (37.1  C) 98.5  F (36.9  C)   TempSrc:  Oral Oral Oral   SpO2:       Weight:       Height:         Gen: Well appearing, NAD    SVE: 4/90/-2, feels more anterior than prior exams    FHT:  Twin A - baseline 140, moderate variability, accels present, no decels  Twin B - baseline 145, moderate variability, accels present, no decels    Reviewed with patient cervix remained unchanged, but has the potential to go quickly. Encouraged her to let us know if contractions become more frequent and intense, or if rectal pressure changes. Confirmed goal for vaginal delivery with patient and reassurance given that we are close to the OR should things progress quickly. Questions answered. Continue expectant management.       Mary Ballard MD  OB/GYN Resident PGY-3  5:35 PM September 23, 2022

## 2022-09-23 NOTE — PLAN OF CARE
Goal Outcome Evaluation:      Patient VSS and afebrile. Pt reports decrease in uterine activity throughout the day, reports scant amount of mucous discharge, denies any other s/s of PTL, infection, pain or change in other condition. 's twin A and 130's twin B, occasional uterine irritability noted on toco, see flowsheet for more detail.Patient received acupuncture services in the afternoon.

## 2022-09-23 NOTE — PROGRESS NOTES
[facility] Antepartum Progress Note    Subjective:   Osman is feeling okay this morning. States she had trouble sleeping last night because she was waking up with contractions. She feels that her contractions are more painful, and require her to breathe through them, but they are irregular. Her fluid is unchanged. Denies vaginal bleeding. Reports regular fetal movement x2.      Objective:  Vitals:    22 1635 22 2058 22 2100 22 0400   BP: 129/72  121/70 118/69   BP Location: Left arm      Patient Position: Semi-Steven's      Cuff Size: Adult Regular      Pulse:       Resp:    Temp: 98.5  F (36.9  C) 98.3  F (36.8  C)  98.1  F (36.7  C)   TempSrc: Oral Oral  Oral   SpO2:       Weight:       Height:         Gen: Resting comfortably in bed, NAD  CV: Well perfused  Resp: Normal work of breathing   Abd: Gravid, non-distended     BPP: 8/8 both twins; subjectively low fluid for A       FHT: - Twin A: , moderate variability, + accels, no decels   - Twin B: , moderate variability, + accels, occasional variable decels  [place]: irregular contractions; irritability     Assessment  Osman Banks is a 32 year old  at 32w5d by LMP c/w 8w5d US who is HD#5 PPROM on the morning of  in the setting of di-di twin gestation. She was initially admitted and was found to progress quickly to /-1; has since been stable without additional cervical change. No signs of intra-amniotic infection at this time. Will try to avoid unnecessary SVE given increased risk of infection in the setting of PPROM but will repeat if clinically indicated.      # PPROM  # PTL   # Di-di twin gestation  - Amnisure positive  prior to transfer   - D#5 of latency antibiotics with clindamycin (completed azithromycin on day of admission)  - GBS negative as of  (OSH and [facility]); vancomycin discontinued   - S/p BMZ from -  - UA culture negative, GC/CT negative from OSH  - s/p NICU  consult   - Desires vaginal delivery; consented for C/S if indicated, including C/S for second twin if indicated.   - Planning BTL if requires C/S     # Fetal Well Being  - FHT reactive and reassuring x2   - TID EFM while on antepartum   - BPP as above; continue twice weekly BPP (next 9/27)     # Tobacco use in pregnancy   - Consider nicotine replacement while inpatient; patient currently declines     # Routine PNC   - GCT normal    - Rh negative, Rhogam 8/19, type and screen q72hrs   - S/p TdAP 9/2/22  - Ok to go outside in wheelchair with chaperone/partner       Mel Nelson MS4     I was present with the medical student who participated in the service and in the documentation of this note.  I have verified the history and personally performed the physical exam and medical decision making, and have verified the content of the note, which accurately reflect my assessment of the patient and the plan of care.       Mary Ballard MD  OBGYN PGY-3  10:02 AM September 23, 2022      Physician Attestation   I saw this patient with the resident and agree with the resident/fellow's findings and plan of care as documented in the note.      I personally reviewed vital signs, medications, imaging and EFM.    [place] findings: Osman continues to have occasional contractions which are not regular. They are painful when they occur. Continues to leak fluid, particularly with contractions. Feeling fetal movement x 2. No signs of intraamniotic infection. Continue with TID monitoring, twice weekly BPP. Plan for delivery at 34 weeks or sooner if clinically indicated.    EFM today reactive and reassuring for both twins as documented by Mel Nelson MS4.    Candice Orozco MD  Date of Service (when I saw the patient): 09/23/22

## 2022-09-24 PROCEDURE — 120N000002 HC R&B MED SURG/OB UMMC

## 2022-09-24 PROCEDURE — 250N000013 HC RX MED GY IP 250 OP 250 PS 637: Performed by: STUDENT IN AN ORGANIZED HEALTH CARE EDUCATION/TRAINING PROGRAM

## 2022-09-24 PROCEDURE — 59025 FETAL NON-STRESS TEST: CPT | Mod: 26 | Performed by: OBSTETRICS & GYNECOLOGY

## 2022-09-24 PROCEDURE — 99231 SBSQ HOSP IP/OBS SF/LOW 25: CPT | Mod: 25 | Performed by: OBSTETRICS & GYNECOLOGY

## 2022-09-24 RX ADMIN — CLINDAMYCIN HYDROCHLORIDE 450 MG: 300 CAPSULE ORAL at 12:40

## 2022-09-24 RX ADMIN — PRENATAL VITAMINS-IRON FUMARATE 27 MG IRON-FOLIC ACID 0.8 MG TABLET 1 TABLET: at 08:36

## 2022-09-24 RX ADMIN — CLINDAMYCIN HYDROCHLORIDE 450 MG: 300 CAPSULE ORAL at 04:21

## 2022-09-24 RX ADMIN — CLINDAMYCIN HYDROCHLORIDE 450 MG: 300 CAPSULE ORAL at 21:51

## 2022-09-24 RX ADMIN — CALCIUM CARBONATE (ANTACID) CHEW TAB 500 MG 500 MG: 500 CHEW TAB at 21:55

## 2022-09-24 RX ADMIN — FAMOTIDINE 10 MG: 10 TABLET ORAL at 08:36

## 2022-09-24 ASSESSMENT — ACTIVITIES OF DAILY LIVING (ADL)
ADLS_ACUITY_SCORE: 18

## 2022-09-24 NOTE — PROVIDER NOTIFICATION
"   09/24/22 0578   Provider Notification   Provider Name/Title Dr Malin   Method of Notification In Department   Request Evaluate in Person   Notification Reason Status Update   RN went to bedside to round on patient. Patient complained that she \"wasn't feeling well\" and \"felt icky.\" Patient reported she felt sick to her stomach and overall just felt off. Put patient on FHT to observe uterine and fetal activity. FHT for A and B both were reassuring, see flow record. Uterine activity showed some irritability but no contractions. Patient denies any increase in contractions. VSS. Plan per Dr Malin is to allow patient to come off monitor and just closely monitor patient for any other symptoms. Patient is aware to notify RN if anything else feels off.   "

## 2022-09-24 NOTE — PLAN OF CARE
Goal Outcome Evaluation:    Plan of Care Reviewed With: patient, spouse     Patient remains stable this shift. VSS. Afebrile. Patient states she is still feeling rectal pressure but nothing has increased in intensity or pain and is able to continue with her daily activities. Patient states occasional tightening. Patient states leaking clear small amounts of fluid. Patient reports both A and B active fetal movement. Monitoring of both A and B appropriate, see flow record. Patient visiting with  and sister in law. Denies any needs at this time.

## 2022-09-24 NOTE — PLAN OF CARE
Patient here d/t PPROM of baby A. VSS. Patient continues to leak fluid; denies vaginal bleeding or changes in discharge. Patient stated during the night that she felt more rectal pressure that woke her up. Dr. Patten (G3) to bedside and SVE found no cervical change. Patient agreeable to let staff know if pressure gets worse or if she begins to contract more. EFM as charted. Continue with plan of care.

## 2022-09-24 NOTE — PROGRESS NOTES
[facility] Antepartum Progress Note    Subjective:   Doing well at this time. Notes some rectal pressure but feels like it is quite stable and will be relieved after BM. No regular, painful contractions. Continues to note leaking fluid that is unchanged. No subjective fever, chills, bodyaches. Otherwise feeling pretty well. A little dry from the hospital air but plans to go outside a bit today. Discussed a bit the logistics of transfer of her or babies closer to home ([place]) if she remains stable.    Overnight awoke with rectal pressure. Repeat SVE with cervix unchanged at /-1.    Objective:  Vitals:    22 2118 22 0127 22 0533 22 0836   BP: 118/61 123/73 121/70 122/68   Pulse:       Resp: 18 18 18 16   Temp: 98.6  F (37  C) 98.4  F (36.9  C) 98.6  F (37  C) 98.5  F (36.9  C)   TempSrc: Oral Oral Oral Oral   SpO2:       Weight:       Height:         Gen: Resting comfortably in bed, NAD  CV: Well perfused  Resp: Normal work of breathing   Abd: Gravid, non-distended     BPP: 8/8 both twins; subjectively low fluid for A      FHT: - Twin A: , moderate variability, + accels, no decels   - Twin B: , moderate variability, + accels, no decels  [place]: irregular contractions 1 in 10 min    Assessment  Osman Banks is a 32 year old  at 32w5d by LMP c/w 8w5d US who is HD#6 PPROM on the morning of  in the setting of di-di twin gestation. She was initially admitted and was found to progress quickly to /-1; has since been stable without additional cervical change. No signs of intra-amniotic infection at this time. Will try to avoid unnecessary SVE given increased risk of infection in the setting of PPROM but will repeat if clinically indicated.      # PPROM  # PTL   # Di-di twin gestation  - Amnisure positive  prior to transfer   - D#6 of latency antibiotics with clindamycin (completed azithromycin on day of admission)  - [facility]  ([facility]); vancomycin discontinued   - S/p BMZ from 9/19-9/20  - UA culture negative, GC/CT negative from OSH  - s/p NICU consult   - Desires vaginal delivery; consented for C/S if indicated, including C/S for second twin if indicated.   - Planning BTL if requires C/S  - Will continue expectant management at this time, if patient strongly desires transfer at 34 weeks can discuss with [place] [facility] and NICU about logistics.     # Fetal Well Being  - FHT reactive and reassuring x2   - TID EFM while on antepartum   - BPP as above; continue twice weekly BPP (next 9/27)     # Tobacco use in pregnancy   - Consider nicotine replacement while inpatient; patient currently declines     # Routine PNC   - GCT normal    - Rh negative, Rhogam 8/19, type and screen q72hrs   - S/p TdAP 9/2/22  - Ok to go outside in wheelchair with chaperone/partner    Seen with Dr. Pam Malin MD  ObGyn Resident, PGY-3  09/24/2022

## 2022-09-24 NOTE — PROGRESS NOTES
Brief Progress Note    S: Patient is continuing to feel more rectal pressure and feels like she needs to have a BM. It is waking her up out of sleep. Still feeling uterine contractions/irritability that is unchanged. Having a hard time getting comfortable in bed.     O:   Vitals:    22 0807 22 1319 22 1728 22 2118   BP: 117/63 122/73 121/72 118/61   Pulse:       Resp: 16 17 16 18   Temp: 98.7  F (37.1  C) 98.5  F (36.9  C) 98.5  F (36.9  C) 98.6  F (37  C)   TempSrc: Oral Oral Oral Oral   SpO2:       Weight:       Height:         GEN:  Alert, no acute distress  SVE: /-2    A/P:  Osman Banks is a 32 year old  at 32w6d, here for PPROM and PTL. This is a di/di twin pregnancy. She initially made cervical change on admission, cervix has been unchanged over 4 cm for several days now. With increasing rectal pressure, SVE performed and was unchanged. Discussed continuing expectant management at this time.    Nomi Patten MD  OB/GYN PGY-3  2022 2:41 AM

## 2022-09-24 NOTE — PROVIDER NOTIFICATION
09/24/22 0130   Provider Notification   Provider Name/Title Dr. Patten (G3)   Method of Notification Phone   Request Evaluate in Person   Notification Reason Other (Comment)  (Rectal Pressure)     Notified Dr. Patten as pt stating that she is feeling more rectal pressure. Per Dr. Patten, patient to be put on TOCO and she will come to see her at bedside.

## 2022-09-25 PROCEDURE — 120N000002 HC R&B MED SURG/OB UMMC

## 2022-09-25 PROCEDURE — 250N000013 HC RX MED GY IP 250 OP 250 PS 637: Performed by: STUDENT IN AN ORGANIZED HEALTH CARE EDUCATION/TRAINING PROGRAM

## 2022-09-25 PROCEDURE — 99231 SBSQ HOSP IP/OBS SF/LOW 25: CPT | Mod: 25 | Performed by: OBSTETRICS & GYNECOLOGY

## 2022-09-25 PROCEDURE — 59025 FETAL NON-STRESS TEST: CPT | Mod: 26 | Performed by: OBSTETRICS & GYNECOLOGY

## 2022-09-25 PROCEDURE — 250N000013 HC RX MED GY IP 250 OP 250 PS 637: Performed by: OBSTETRICS & GYNECOLOGY

## 2022-09-25 RX ORDER — FLUCONAZOLE 150 MG/1
150 TABLET ORAL ONCE
Status: COMPLETED | OUTPATIENT
Start: 2022-09-25 | End: 2022-09-25

## 2022-09-25 RX ADMIN — CLINDAMYCIN HYDROCHLORIDE 450 MG: 300 CAPSULE ORAL at 20:04

## 2022-09-25 RX ADMIN — CLINDAMYCIN HYDROCHLORIDE 450 MG: 300 CAPSULE ORAL at 12:18

## 2022-09-25 RX ADMIN — FAMOTIDINE 10 MG: 10 TABLET ORAL at 20:05

## 2022-09-25 RX ADMIN — FAMOTIDINE 10 MG: 10 TABLET ORAL at 07:36

## 2022-09-25 RX ADMIN — CLINDAMYCIN HYDROCHLORIDE 450 MG: 300 CAPSULE ORAL at 05:31

## 2022-09-25 RX ADMIN — FLUCONAZOLE 150 MG: 150 TABLET ORAL at 14:50

## 2022-09-25 RX ADMIN — PRENATAL VITAMINS-IRON FUMARATE 27 MG IRON-FOLIC ACID 0.8 MG TABLET 1 TABLET: at 07:36

## 2022-09-25 ASSESSMENT — ACTIVITIES OF DAILY LIVING (ADL)
ADLS_ACUITY_SCORE: 18

## 2022-09-25 NOTE — PROGRESS NOTES
[facility] Antepartum Progress Note    Subjective:   Had some contractions overnight but more rare than previous. Feels she is getting a yeast infection from antibiotics. Feeling fetal movement x 2. No vaginal bleeding, still with stable leakage of fluid.    Overnight awoke with rectal pressure. Repeat SVE with cervix unchanged at /-1.    Objective:  Vitals:    22 1715 22 2156 22 0629 22 1045   BP: 121/63 120/66  130/72   BP Location:  Left arm     Patient Position:  Semi-Steven's     Cuff Size:  Adult Regular     Pulse:       Resp:  15 15 16   Temp: 98.6  F (37  C) 98.2  F (36.8  C) 98.8  F (37.1  C) 98.3  F (36.8  C)   TempSrc: Oral Oral Oral Oral   SpO2:       Weight:       Height:         Gen: Resting comfortably in bed, NAD  CV: Well perfused  Resp: Normal work of breathing   Abd: Gravid, non-distended, nontender     BPP: 8/8 both twins; subjectively low fluid for A      FHT: - Twin A: , moderate variability, + accels, no decels   - Twin B: , moderate variability, + accels, no decels  [place]: irritability    Assessment  Osman Banks is a 32 year old  at 33w0d by LMP c/w 8w5d US who is HD#7 PPROM on the morning of  in the setting of di-di twin gestation. She was initially admitted and was found to progress quickly to /-1; has since been stable without additional cervical change. No signs of intra-amniotic infection at this time. Will try to avoid unnecessary SVE given increased risk of infection in the setting of PPROM but will repeat if clinically indicated.      # PPROM  # PTL   # Di-di twin gestation  - Amnisure positive  prior to transfer   - D#7 of latency antibiotics with clindamycin (completed azithromycin on day of admission)  - GBS negative as of  (OSH and [facility]); vancomycin discontinued   - S/p BMZ from -  - UA culture negative, GC/CT negative from OSH  - s/p NICU consult   - Desires vaginal delivery; consented for C/S  if indicated, including C/S for second twin if indicated.   - Planning BTL if requires C/S  - Will continue expectant management at this time, if patient strongly desires transfer at 34 weeks can discuss with [facility] and NICU about logistics.  - one dose diflucan due to concern for yeast     # Fetal Well Being  - FHT reactive and reassuring x2   - TID EFM while on antepartum   - BPP as above; continue twice weekly BPP (next 9/27)     # Tobacco use in pregnancy   - Consider nicotine replacement while inpatient; patient currently declines     # Routine PNC   - GCT normal    - Rh negative, Rhogam 8/19, type and screen q72hrs   - S/p TdAP 9/2/22  - Ok to go outside in wheelchair with chaperone/partner    Candice Orozco MD  Maternal Fetal Medicine

## 2022-09-25 NOTE — PLAN OF CARE
Goal Outcome Evaluation:    Plan of Care Reviewed With: patient, spouse      Patient remains stable this shift. Denies any needs. Continues to leak mucous and clear fluid. Patient still feeling pressure but states it hasn't progressed in intensity or pain. Patient reports occasional cramps but nothing consistent. Patient's  is leaving tonight to head back home for the week. Patient is anxious to schedule IOL for next Sunday at 34 weeks. Continue with plan of care.

## 2022-09-26 LAB
ABO/RH(D): ABNORMAL
ANTIBODY SCREEN: POSITIVE
ERYTHROCYTE [DISTWIDTH] IN BLOOD BY AUTOMATED COUNT: 13 % (ref 10–15)
FERRITIN SERPL-MCNC: 12 NG/ML (ref 12–150)
HCT VFR BLD AUTO: 32.9 % (ref 35–47)
HGB BLD-MCNC: 10.7 G/DL (ref 11.7–15.7)
MCH RBC QN AUTO: 29.2 PG (ref 26.5–33)
MCHC RBC AUTO-ENTMCNC: 32.5 G/DL (ref 31.5–36.5)
MCV RBC AUTO: 90 FL (ref 78–100)
PLATELET # BLD AUTO: 186 10E3/UL (ref 150–450)
RBC # BLD AUTO: 3.67 10E6/UL (ref 3.8–5.2)
SARS-COV-2 RNA RESP QL NAA+PROBE: NEGATIVE
SPECIMEN EXPIRATION DATE: ABNORMAL
WBC # BLD AUTO: 15 10E3/UL (ref 4–11)

## 2022-09-26 PROCEDURE — U0003 INFECTIOUS AGENT DETECTION BY NUCLEIC ACID (DNA OR RNA); SEVERE ACUTE RESPIRATORY SYNDROME CORONAVIRUS 2 (SARS-COV-2) (CORONAVIRUS DISEASE [COVID-19]), AMPLIFIED PROBE TECHNIQUE, MAKING USE OF HIGH THROUGHPUT TECHNOLOGIES AS DESCRIBED BY CMS-2020-01-R: HCPCS | Performed by: OBSTETRICS & GYNECOLOGY

## 2022-09-26 PROCEDURE — 59025 FETAL NON-STRESS TEST: CPT | Mod: 26 | Performed by: OBSTETRICS & GYNECOLOGY

## 2022-09-26 PROCEDURE — 85027 COMPLETE CBC AUTOMATED: CPT | Performed by: STUDENT IN AN ORGANIZED HEALTH CARE EDUCATION/TRAINING PROGRAM

## 2022-09-26 PROCEDURE — 86901 BLOOD TYPING SEROLOGIC RH(D): CPT | Performed by: STUDENT IN AN ORGANIZED HEALTH CARE EDUCATION/TRAINING PROGRAM

## 2022-09-26 PROCEDURE — 250N000013 HC RX MED GY IP 250 OP 250 PS 637: Performed by: STUDENT IN AN ORGANIZED HEALTH CARE EDUCATION/TRAINING PROGRAM

## 2022-09-26 PROCEDURE — 99231 SBSQ HOSP IP/OBS SF/LOW 25: CPT | Mod: 25 | Performed by: OBSTETRICS & GYNECOLOGY

## 2022-09-26 PROCEDURE — 120N000002 HC R&B MED SURG/OB UMMC

## 2022-09-26 PROCEDURE — 36415 COLL VENOUS BLD VENIPUNCTURE: CPT | Performed by: STUDENT IN AN ORGANIZED HEALTH CARE EDUCATION/TRAINING PROGRAM

## 2022-09-26 RX ADMIN — CLINDAMYCIN HYDROCHLORIDE 450 MG: 300 CAPSULE ORAL at 12:04

## 2022-09-26 RX ADMIN — CALCIUM CARBONATE (ANTACID) CHEW TAB 500 MG 500 MG: 500 CHEW TAB at 18:38

## 2022-09-26 RX ADMIN — PRENATAL VITAMINS-IRON FUMARATE 27 MG IRON-FOLIC ACID 0.8 MG TABLET 1 TABLET: at 08:40

## 2022-09-26 RX ADMIN — CLINDAMYCIN HYDROCHLORIDE 450 MG: 300 CAPSULE ORAL at 04:02

## 2022-09-26 RX ADMIN — FAMOTIDINE 10 MG: 10 TABLET ORAL at 20:12

## 2022-09-26 RX ADMIN — FAMOTIDINE 10 MG: 10 TABLET ORAL at 08:40

## 2022-09-26 ASSESSMENT — ACTIVITIES OF DAILY LIVING (ADL)
ADLS_ACUITY_SCORE: 18

## 2022-09-26 NOTE — PROGRESS NOTES
"[facility] Antepartum Progress Note  22    Subjective:   Osman was feeling back to herself yesterday but overnight began experiencing \"hot flashes\" that culminated in a sweats. She described this as feeling like she might be \"coming down with something\". This morning she also began having contractions again around 4 AM. She has not had any vaginal bleeding. Continues to leak fluid without mucous and now noticing increased white vaginal discharge. Yesterday she had some tongue rawness and took fluconazole for ocncern for thrush.       Objective:  Patient Vitals for the past 24 hrs:   BP Temp Temp src Pulse Resp   22 0834 119/74 98.5  F (36.9  C) Oral 85 16   22 2007 129/60 98.3  F (36.8  C) Oral -- 16   22 1350 126/66 98.9  F (37.2  C) Oral -- 16   22 1045 130/72 98.3  F (36.8  C) Oral -- 16   Gen: Resting comfortably in bed, NAD  CV: Well perfused  Resp: Normal work of breathing   Abd: Gravid, non-distended, uncomfortable with palpation but not tender      BPP: 8/8 both twins; subjectively low fluid for A      FHT: - Twin A: , moderate variability, + accels, no decels   - Twin B: , moderate variability, + accels, no decels  [place]: irritability      Assessment  Osman Banks is a 32 year old  at 33w1d by LMP c/w 8w5d US who is HD#8 PPROM on the morning of  in the setting of di-di twin gestation. She was initially admitted and was found to progress quickly to /-1; has since been stable without additional cervical change. No objective signs of intra-amniotic infection at this time but possibly developing subjective symptoms that will be monitored closely. Will try to avoid unnecessary SVE given increased risk of infection in the setting of PPROM but will repeat if clinically indicated.      # PPROM  # PTL   # Di-di twin gestation  Amnisure positive  prior to transfer. S/p 7D course of latency antibiotics with clindamycin (completed azithromycin on " day of admission). GBS negative as of 9/20 (OSH and [facility]). UA culture negative, GC/CT negative from OSH. S/p BMZ from 9/19-9/20 and s/p NICU consult   - Desires vaginal delivery; consented for C/S if indicated, including C/S for second twin if indicated.   - Planning BTL if requires C/S  - Will continue expectant management at this time, if patient strongly desires transfer at 34 weeks can discuss with [place] [facility] and NICU about logistics.     # Fetal Well Being  - FHT reactive and reassuring x2   - TID EFM while on antepartum   - BPP as above; continue twice weekly BPP (next 9/27)     # Tobacco use in pregnancy   - Consider nicotine replacement while inpatient; patient currently declines     # Candidiasis:   - S/p fluconazole 9/15 for concern for thrush, yeast infection     # Routine PNC   - GCT normal    - Rh negative, Rhogam 8/19, type and screen q72hrs   - S/p TdAP 9/2/22  - Ok to go outside in wheelchair with chaperone/partner    Sharri Andrade MD  Obstetrics & Gynecology, PGY-4  09/26/2022 8:54 AM     Physician Attestation   IIram DO, saw and evaluated Osman Banks with the resident.  .    I personally reviewed the vital signs, medications, labs and imaging.    My key history or physical exam findings:   Patient feeling okay this morning. Was sweating in the middle of the night- felt a little off.  Now feeling better.  Denies chills.  Some bilateral lower abd cramping, but no fundal tenderness.  +FM x 2.  Still leaking, clear fluid.  No bleeding.     Key management decisions made by me:   Continue current care.  Will evaluate closely for signs/symptoms of IUI.  NSTs: reactive x 2.      Iram Burleson DO  Date of Service (when I saw the patient): 09/26/22    Time Spent on this Encounter   Iram PORTILLO DO, spent a total of 15 minutes face to face or coordinating care of Osman Banks.  Over 50% of my time on the unit was spent counseling  the patient and/or coordinating care regarding PPROM, Twin A, Di/di twins.

## 2022-09-26 NOTE — PLAN OF CARE
Goal Outcome Evaluation:    Having a few contractions that are mild quality. Twin A FHT's 130 with moderate variability and accelerations. Twin B FHT's 145 with moderate variability and accelerations. No decelerations noted for both twins.VSS and afebrile. States that she is leaking a scant amount of clear amniotic fluid. Denies uterine tenderness. Working remote this afternoon. Offers no complaints. Stable condition.

## 2022-09-26 NOTE — PLAN OF CARE
VSS. Afebrile, Patient denies HA, RUQ pain, vision bleeding and LOF. Continues to leak scant clear fluid. Patient reports feeling contractions starting after VS check at 0400. Otherwise rested through the night and has no other complaints. See flowsheets for FHR x2 and uterine activity. Patient denies pain. Plan of care reviewed with patient, understanding verbalized. Continue with current plan of care.

## 2022-09-27 ENCOUNTER — APPOINTMENT (OUTPATIENT)
Dept: ULTRASOUND IMAGING | Facility: CLINIC | Age: 32
End: 2022-09-27
Payer: COMMERCIAL

## 2022-09-27 PROCEDURE — 76818 FETAL BIOPHYS PROFILE W/NST: CPT | Mod: 26 | Performed by: OBSTETRICS & GYNECOLOGY

## 2022-09-27 PROCEDURE — 250N000013 HC RX MED GY IP 250 OP 250 PS 637: Performed by: STUDENT IN AN ORGANIZED HEALTH CARE EDUCATION/TRAINING PROGRAM

## 2022-09-27 PROCEDURE — 99231 SBSQ HOSP IP/OBS SF/LOW 25: CPT | Mod: 25 | Performed by: OBSTETRICS & GYNECOLOGY

## 2022-09-27 PROCEDURE — 76819 FETAL BIOPHYS PROFIL W/O NST: CPT | Mod: 59

## 2022-09-27 PROCEDURE — 250N000011 HC RX IP 250 OP 636: Performed by: STUDENT IN AN ORGANIZED HEALTH CARE EDUCATION/TRAINING PROGRAM

## 2022-09-27 PROCEDURE — 258N000003 HC RX IP 258 OP 636: Performed by: STUDENT IN AN ORGANIZED HEALTH CARE EDUCATION/TRAINING PROGRAM

## 2022-09-27 PROCEDURE — 120N000002 HC R&B MED SURG/OB UMMC

## 2022-09-27 RX ORDER — METHYLPREDNISOLONE SODIUM SUCCINATE 125 MG/2ML
125 INJECTION, POWDER, LYOPHILIZED, FOR SOLUTION INTRAMUSCULAR; INTRAVENOUS
Status: DISCONTINUED | OUTPATIENT
Start: 2022-09-27 | End: 2022-10-02

## 2022-09-27 RX ORDER — DIPHENHYDRAMINE HYDROCHLORIDE 50 MG/ML
50 INJECTION INTRAMUSCULAR; INTRAVENOUS
Status: DISCONTINUED | OUTPATIENT
Start: 2022-09-27 | End: 2022-10-02

## 2022-09-27 RX ADMIN — ACETAMINOPHEN 650 MG: 325 TABLET, FILM COATED ORAL at 21:14

## 2022-09-27 RX ADMIN — PRENATAL VITAMINS-IRON FUMARATE 27 MG IRON-FOLIC ACID 0.8 MG TABLET 1 TABLET: at 08:45

## 2022-09-27 RX ADMIN — FAMOTIDINE 10 MG: 10 TABLET ORAL at 20:11

## 2022-09-27 RX ADMIN — FAMOTIDINE 10 MG: 10 TABLET ORAL at 08:45

## 2022-09-27 RX ADMIN — IRON SUCROSE 300 MG: 20 INJECTION, SOLUTION INTRAVENOUS at 13:37

## 2022-09-27 ASSESSMENT — ACTIVITIES OF DAILY LIVING (ADL)
ADLS_ACUITY_SCORE: 18

## 2022-09-27 NOTE — PROGRESS NOTES
SPIRITUAL HEALTH SERVICES Progress Note  [facility] [place]    Visited with pt per LOS and introduced self and oriented her to [facility]. Pt. Did not have any immediate needs but appreciated visit from [facility].     Allegra Conley  Chaplain Resident  Pager [phone]    * [facility] remains available 24/7 for emergent requests/referrals, either by having the switchboard page the on-call  or by entering an ASAP/STAT consult in Epic (this will also page the on-call ). Routine Epic consults receive an initial response within 24 hours.*

## 2022-09-27 NOTE — PLAN OF CARE
Osman woke up from her nap stating that she had a hot flash and was diaphoretic. Temp 98.6. Denies body aches and respiratory symptoms. Osman states that this has happened the past few nights. Dr. Burleson and Shubham are aware that this has happened before. Reported to Dr. Jalloh.

## 2022-09-27 NOTE — PROGRESS NOTES
[facility] Antepartum Progress Note  22    Subjective:   Osman is feeling pretty tired this morning. Didn't sleep well due to intermittent cramping overnight. She endorses having episode of night sweats again, but doesn't feel feverish. Denies chills or body aches. Is otherwise doing okay today. Feeling normal fetal movement. No changes to leaking fluid, still clear and odorless. No vaginal bleeding. No other concerns or questions this morning.    Objective:  Patient Vitals for the past 24 hrs:   BP Temp Temp src Pulse Resp Weight   22 0600 120/78 -- -- -- 16 --   22 0500 -- -- -- -- -- 87.4 kg (192 lb 9.6 oz)   22 2015 131/74 98.2  F (36.8  C) Oral -- 16 --   22 1628 127/72 98.6  F (37  C) Oral 78 16 --   22 1202 127/74 98.3  F (36.8  C) Oral 82 16 --   22 0834 119/74 98.5  F (36.9  C) Oral 85 16 --     Gen: Resting comfortably, getting ultrasound  CV: Well perfused  Resp: Normal work of breathing   Abd: Gravid, non-distended, non-tender to palpation     BPP: 4/8 twin A (off for fluid, gross movement), 88 twin B      FHT: - Twin A: , moderate variability, + accels, no decels   - Twin B: , moderate variability, + accels, no decels  [place]: irritability      Assessment  Osman Banks is a 32 year old  at 33w2d by LMP c/w 8w5d US who is HD#9 PPROM on the morning of  in the setting of di-di twin gestation. She was initially admitted and was found to progress quickly to /-1; has since been stable without additional cervical change. No objective signs of intra-amniotic infection at this time, though continues to have intermittent subjective symptoms. Continue to avoid unnecessary SVE given increased risk of infection in the setting of PPROM but will repeat if clinically indicated.      # PPROM  # PTL   # Di-di twin gestation  Amnisure positive  prior to transfer. S/p 7D course of latency antibiotics with  clindamycin (completed azithromycin on day of admission). GBS negative as of 9/20 (OSH and [facility]). UA culture negative, GC/CT negative from OSH. S/p BMZ from 9/19-9/20 and s/p NICU consult   - Desires vaginal delivery; consented for C/S if indicated, including C/S for second twin if indicated.   - Planning BTL if requires C/S    # Anemia  - Hgb 10.7, ferritin 12 as of 9/26  - Plan for IV Venofer 300mg x1 today     # Fetal Well Being  - FHT reactive and reassuring x2   - TID EFM while on antepartum   - BPP as above; given reassuring tracing, plan to repeat BPP in 24h (9/28) then continue twice weekly T/F BPP (next 9/30). Will discuss option to move towards delivery pending repeat BPP     # Tobacco use in pregnancy   - Offered nicotine replacement while inpatient; patient currently declines     # Candidiasis:   - S/p fluconazole 9/15 for concern for thrush, yeast infection     # Routine PNC   - GCT normal    - Rh negative, Rhogam 8/19, type and screen q72hrs   - S/p TdAP 9/2/22  - Ok to go outside in wheelchair with chaperone/partner    Patient seen and plan reviewed with Dr. Benjy Abad MD  ObGyn, PGY-3  09/27/2022 8:36 AM     Physician Attestation   Iram PORTILLO DO, saw and evaluated Osman Banks with the resident. .    I personally reviewed the vital signs, medications, labs and imaging.    My key history or physical exam findings:   Osman feels well.  Denies fever/chills.  Some lower abdominal cramping, but no discrete contractions.  +FM x 2.  No other complaints.    Key management decisions made by me:   Continue current care for PPROM.    NSTs x 2 reactive.  BPP today 6/10 for Fetus 1 (off for fluid and gross movement), however NST is reactive.  Plan to repeat BPPs tomorrow.    Iram Burleson DO  Date of Service (when I saw the patient): 09/27/22    Time Spent on this Encounter   Iram PORTILLO DO, spent a total of 15 minutes face to face or  coordinating care of Osman Banks.  Over 50% of my time on the unit was spent counseling the patient and/or coordinating care regarding PPROM.

## 2022-09-27 NOTE — PLAN OF CARE
Goal Outcome Evaluation:     Having occasional contractions. Continues to leak a scant amount of clear amniotic fluid. Uterus non tender. VSS. FHT's twin A 140 with moderate variability, accelerations and a variable deceleration. Twin B FHT's 145 with moderate variability, accelerations and no decelerations. Denies bleeding. Received Iron sucrose and tolerated the infusion well. Plan to repeat BPP tomorrow and have iron infusions every 72 hours. Stable condition.

## 2022-09-27 NOTE — PLAN OF CARE
VSS, afebrile. EFM see flowsheet. Pt reports not sleeping well overnight. Continues to leak scant amounts of clear fluid. Offers no complaints. Continue with plan of care.

## 2022-09-27 NOTE — PROGRESS NOTES
Osman left a VM about the [employer] parking pass for her  and concern it is for the [place] [facility].  This writer returned Osman's call and left a detailed VM.   has a [employer] parking pass which is good at any ramp on the [facility].  [employer] is the type of parking pass which means it is good for 5 entrances and 5 exits (10 total).    Deepa ATWOODW, MSW, LICSW  Maternal Child Health

## 2022-09-28 ENCOUNTER — APPOINTMENT (OUTPATIENT)
Dept: ULTRASOUND IMAGING | Facility: CLINIC | Age: 32
End: 2022-09-28
Payer: COMMERCIAL

## 2022-09-28 PROCEDURE — 76818 FETAL BIOPHYS PROFILE W/NST: CPT | Mod: 26 | Performed by: OBSTETRICS & GYNECOLOGY

## 2022-09-28 PROCEDURE — 99231 SBSQ HOSP IP/OBS SF/LOW 25: CPT | Mod: 25 | Performed by: OBSTETRICS & GYNECOLOGY

## 2022-09-28 PROCEDURE — 250N000013 HC RX MED GY IP 250 OP 250 PS 637: Performed by: STUDENT IN AN ORGANIZED HEALTH CARE EDUCATION/TRAINING PROGRAM

## 2022-09-28 PROCEDURE — 120N000002 HC R&B MED SURG/OB UMMC

## 2022-09-28 PROCEDURE — 76819 FETAL BIOPHYS PROFIL W/O NST: CPT | Mod: 59

## 2022-09-28 RX ADMIN — FAMOTIDINE 10 MG: 10 TABLET ORAL at 08:49

## 2022-09-28 RX ADMIN — PRENATAL VITAMINS-IRON FUMARATE 27 MG IRON-FOLIC ACID 0.8 MG TABLET 1 TABLET: at 08:49

## 2022-09-28 ASSESSMENT — ACTIVITIES OF DAILY LIVING (ADL)
ADLS_ACUITY_SCORE: 18

## 2022-09-28 NOTE — PROVIDER NOTIFICATION
"   09/27/22 2115   Provider Notification   Provider Name/Title Nellie MANN   Method of Notification Phone   Request Evaluate - Remote   Notification Reason Status Update   Spoke to MD on phone, reviewed EFM. Pt not feeling all contractions, and when she does \"light period cramping\". EFM x2 Cat I. Abdomen palpates mild during contractions. Change in discharge, light green/yellow. Fluid remains scant clear/light yellow. Okay to continue with TID EFM.   "

## 2022-09-28 NOTE — PROVIDER NOTIFICATION
09/28/22 1413   Provider Notification   Provider Name/Title Dr. Burleson and Dr. Jalloh   Method of Notification In Department   Request Evaluate - Remote   Notification Reason Status Update   MD's in Dept and were asked to review EFM and toco, Reactive x2 per Dr. Burleson, and pt may be off monitors now after requesting to go off unit to visit with her 2 year old son and spouse in the lobby.

## 2022-09-28 NOTE — PROGRESS NOTES
[facility] Antepartum Progress Note  2022     Subjective:   Osman is feeling okay this AM.  Did have some cramping and sweating again overnight but denies fever/chills.  Denies regular contractions, vaginal bleeding.  Continues to leak clear fluid.  Active fetal movement.  No additional concerns.    Objective:  Patient Vitals for the past 24 hrs:   BP Temp Temp src Pulse Resp   22 1209 118/79 98.5  F (36.9  C) Oral 98 18   22 0613 117/59 98.4  F (36.9  C) Oral -- 16   22 0204 -- 98.1  F (36.7  C) Oral -- --   22 -- 98.3  F (36.8  C) Oral 85 15   22 1613 -- 98.4  F (36.9  C) Oral -- --     Gen: Resting comfortably, alert, oriented  CV: Regular rate  Resp: Normal work of breathing   Abd: Gravid, non-distended, non-tender to palpation     BPP:  10/10 for both twins      FHT: - Twin A: , moderate variability, + accels, no decels   - Twin B: , moderate variability, + accels, no decels  [employer]: irritability      Assessment  Osman Banks is a 32 year old  at 33w3d by LMP c/w 8w5d  who is HD#10 for PPROM on the morning of  in the setting of di-di twin gestation. She was initially admitted and was found to progress quickly to /-1; has since been stable without additional cervical change. No objective signs of intra-amniotic infection at this time. Continue to avoid unnecessary SVE given increased risk of infection in the setting of PPROM but will repeat if clinically indicated.      # PPROM  # PTL   # Di-di twin gestation  Amnisure positive  prior to transfer. S/p 7D course of latency antibiotics with clindamycin (completed azithromycin on day of admission). GBS negative as of  (OSH and [facility]). UA culture negative, GC/CT negative from OSH. S/P BMZ from - and s/p NICU consult   - Desires vaginal delivery; consented for C/S if indicated, including C/S for second twin if indicated.   - Planning BTL if requires C/S    # Anemia  - Hgb  "10.7, ferritin 12 as of 9/26  - Has received IV Venofer x 1 9/27/22     # Fetal Well Being  - FHT reactive and reassuring x 2   - TID EFM while on antepartum   - BPP T/F    # Tobacco use in pregnancy   - Offered nicotine replacement while inpatient; patient currently declines     # Candidiasis:   - S/p fluconazole 9/15 for concern for thrush, yeast infection     # Routine PNC   - GCT normal    - Rh negative, Rhogam 8/19, type and screen q72hrs   - S/p TdAP 9/2/22  - Ok to go outside in wheelchair with chaperone/partner    Patient seen and plan reviewed with Dr. Benjy Jalloh MD   Maternal-Fetal Medicine Fellow, PGY6  9/28/2022 2:44 PM      Physician Attestation   I, Iram Burleson DO, saw and evaluated Osman Banks with the fellow.    I personally reviewed the vital signs, medications, labs and imaging.    My key history or physical exam findings:   Patient had another \"rough\" night with more cramping and contractions along with sweating at night . Afebrile.  Denies abdominal pain or cramping right now.  +FM x 2.  BPPs are reassuring today.  NSTs reactive x 2.     No overt signs of infection, labor at this time.  Discussed that should the cramping worsen patient can request a cervical exam if needed (spec exam too difficult due to low position of Fetus 1).     Iram Burleson DO  Date of Service (when I saw the patient): 09/28/22    Time Spent on this Encounter   IIram DO spent a total of 15 minutes face to face or coordinating care of Osman Banks.  Over 50% of my time on the unit was spent counseling the patient and/or coordinating care regarding Di/Di twins, PPROM Twin A.     "

## 2022-09-28 NOTE — PROGRESS NOTES
CLINICAL NUTRITION SERVICES    Reviewed nutrition risk factors due to LOS. Pt is tolerating diet, eating well per nursing documentation. No nutrition issues identified at this time. RD to sign off at this time. RD may be consulted if needs arise.     Pearl Jensen MS, RDN, LDN  RD pager: [phone]

## 2022-09-28 NOTE — PLAN OF CARE
"Osman slept some overnight, however states the cramping makes it difficult to fall back asleep. Pt describes cramping as \"light period cramps\" and are not different than what she has been feeling. Had an episode of night sweats last night, continues to be afebrile, VSS. IV flushed and SL. Denies any HA, blurry vision, N/V, RUQ pain, Vaginal bleeding. Pt reported new onset of yellowish/green discharge yesterday night, which was relayed to Dr Ballard, whom was then bedside to see patient, See MD note. Pt sister has been visiting overnight. Excited for IOL on Sunday. See FS for EFM interpretation.  "

## 2022-09-28 NOTE — PLAN OF CARE
Data: Afebrile. Leaking scant  amounts of clear to light yellow tinged fluid. Contraction pattern stable and within parameters. Fetal assessment Reactive x2. Signs and symptoms of infection absent.  Interventions: Monitor vital signs and indicators of infection every 4 hours while awake. Continue uterine/fetal assessment every shift. Activity level: Regular activity. Preventive measures include Positioning and Frequent voiding. Encourage active range of motion and frequent position changes.  Plan: Continue expectant management. Observe for and notify care provider of indicators of progressing labor, signs/symptoms of infection, or fetal/maternal compromise.  Goal Outcome Evaluation:    Plan of Care Reviewed With: patient     Overall Patient Progress: no change

## 2022-09-28 NOTE — PROGRESS NOTES
09/28/22 1549   Child Life   Location (Antepartum)   Intervention Supportive Check In  (Attempted bedside visit x3 today:  Patient requested CCLS to come back another time, unavailable x2 attempts. Will check-in at a later date.)   Family Support Comment Ongoing patient/family support re: 2.5y son, separation, anticipation for potential NICU admission.   Outcomes/Follow Up   (ASCOM: *[id number])

## 2022-09-28 NOTE — PROGRESS NOTES
Brief Progress Note    In to evaluate patient due to change in vaginal discharge noted tonight. Discharge has been more yellowish/green in color compared to light brown, looks like mucous to her. Hard for her to tell if the color of the fluid she's leaking is changing color. Denies any fevers, but has been waking up with sweats for the last few nights. Continues to have intermittent contractions, but nothing too painful at this time. Some soreness of abdomen with palpation over fetal parts, but this has been stable for the last few weeks.     Vitals:    09/27/22 1413 09/27/22 1613 09/27/22 2014 09/28/22 0204   BP: 118/56      BP Location:       Patient Position:       Cuff Size:       Pulse:   85    Resp: 16  15    Temp:  98.4  F (36.9  C) 98.3  F (36.8  C) 98.1  F (36.7  C)   TempSrc:  Oral Oral Oral   SpO2:       Weight:       Height:         Gen: Well appearing, NAD  CV: Warm and well perfused   Pulm: Breathing comfortably on room air  Abd: Gravid, mild tenderness over fetal part, but no diffuse fundal tenderness to palpation     No evidence of intraamniotic infection at this time. Reviewed risk factors and clinical signs with patient including fevers/chills, fundal tenderness, purulent discharge. Will continue expectant management and plan for repeat BPP in AM given 6/10 BPP/NST today.     Questions answered, patient agreeable.       Mary Ballard MD  OB/GYN Resident PGY-3  2:34 AM September 28, 2022

## 2022-09-29 LAB
ABO/RH(D): ABNORMAL
ANTIBODY SCREEN: POSITIVE
SPECIMEN EXPIRATION DATE: ABNORMAL

## 2022-09-29 PROCEDURE — 86901 BLOOD TYPING SEROLOGIC RH(D): CPT | Performed by: STUDENT IN AN ORGANIZED HEALTH CARE EDUCATION/TRAINING PROGRAM

## 2022-09-29 PROCEDURE — 86850 RBC ANTIBODY SCREEN: CPT | Performed by: STUDENT IN AN ORGANIZED HEALTH CARE EDUCATION/TRAINING PROGRAM

## 2022-09-29 PROCEDURE — 120N000002 HC R&B MED SURG/OB UMMC

## 2022-09-29 PROCEDURE — 59025 FETAL NON-STRESS TEST: CPT | Mod: 26 | Performed by: OBSTETRICS & GYNECOLOGY

## 2022-09-29 PROCEDURE — 36415 COLL VENOUS BLD VENIPUNCTURE: CPT | Performed by: STUDENT IN AN ORGANIZED HEALTH CARE EDUCATION/TRAINING PROGRAM

## 2022-09-29 PROCEDURE — 250N000013 HC RX MED GY IP 250 OP 250 PS 637: Performed by: STUDENT IN AN ORGANIZED HEALTH CARE EDUCATION/TRAINING PROGRAM

## 2022-09-29 PROCEDURE — 99231 SBSQ HOSP IP/OBS SF/LOW 25: CPT | Mod: 25 | Performed by: OBSTETRICS & GYNECOLOGY

## 2022-09-29 RX ADMIN — CALCIUM CARBONATE (ANTACID) CHEW TAB 500 MG 500 MG: 500 CHEW TAB at 20:10

## 2022-09-29 RX ADMIN — PRENATAL VITAMINS-IRON FUMARATE 27 MG IRON-FOLIC ACID 0.8 MG TABLET 1 TABLET: at 08:13

## 2022-09-29 RX ADMIN — FAMOTIDINE 10 MG: 10 TABLET ORAL at 08:12

## 2022-09-29 ASSESSMENT — ACTIVITIES OF DAILY LIVING (ADL)
ADLS_ACUITY_SCORE: 18

## 2022-09-29 NOTE — PLAN OF CARE
Patient rested comfortably throughout the night. VSS, see flowsheets. Patient afebrile. EFM as charted. Patient continues to report occasional contraction pain, states mild to moderate cramping. Frequency and intensity of contractions has not changed over the past few days. Patient reports active fetal movement and scant leaking of clear fluid. Will continue with current plan of care.

## 2022-09-29 NOTE — PLAN OF CARE
Goal Outcome Evaluation:    Plan of Care Reviewed With: patient     Overall Patient Progress: no change        Premature Rupture of Membranes  Data: Afebrile. Leaking scant  amounts of clear fluid. Contraction pattern stable and within parameters. Fetal assessment fetus A and B appropriate for gestational age on afternoon monitoring. See flowsheet.  Signs and symptoms of infection absent.     Interventions: Monitor vital signs and indicators of infection every 4 hours while awake. Continue uterine/fetal assessment 3 times daily.   Preventive measures include Frequent voiding. Encourage active range of motion and frequent position changes.  Plan: Continue expectant management. Observe for and notify care provider of indicators of progressing labor, signs/symptoms of infection, or fetal/maternal compromise.

## 2022-09-29 NOTE — PROGRESS NOTES
[facility] Antepartum Progress Note  2022     Subjective:   Osman is feeling well this morning. Had some milder cramping overnight but not as much as the night prior. Got a little rest. One episode of night sweats again overnight but denies fever/chills.  Denies regular contractions, vaginal bleeding.  Continues to leak watery yellow fluid.  Active fetal movement.  No additional concerns. Wondering what the plan for delivery is.    Objective:  Patient Vitals for the past 24 hrs:   BP Temp Temp src Pulse Resp   22 0804 -- 98  F (36.7  C) Oral -- 22 0601 117/70 97.7  F (36.5  C) Oral -- 22 2200 119/60 98.3  F (36.8  C) Oral -- 22 1935 -- 98.5  F (36.9  C) Oral -- 22 1630 -- 98.2  F (36.8  C) Oral -- 22 1209 118/79 98.5  F (36.9  C) Oral 98 18     Gen: Resting comfortably, alert, oriented  CV: Regular rate  Resp: Normal work of breathing   Abd: Gravid, non-distended, non-tender to palpation     BPP:  10/10 for both twins      FHT: - Twin A: , moderate variability, + accels, no decels   - Twin B: , moderate variability, + accels, no decels  [place]:    Irritability    Assessment/Plan:  Osman Banks is a 32 year old  at 33w4d by LMP c/w 8w5d US who is HD#11 for PPROM on the morning of  in the setting of di-di twin gestation. She was initially admitted and was found to progress quickly to /-1; has since been stable without additional cervical change. No objective signs of intra-amniotic infection at this time. Continue to avoid unnecessary SVE given increased risk of infection in the setting of PPROM but will repeat if clinically indicated.      # PPROM  # PTL, stalled  # Di-di twin gestation  - Amnisure positive  prior to transfer from OSH  - S/p 7D course of latency antibiotics with clinda/azithro  - S/p BMZ from - and s/p NICU consult   - Desires vaginal delivery; consented for C/S if indicated, including  C/S for second twin if indicated.   - Planning BTL if requires C/S  - Will schedule IOL/augmentation for 10/2 at 34w0d    # Anemia  - Hgb 10.7, ferritin 12 as of 9/26  - S/p IV Venofer x1 (9/27)     # Fetal Well Being  - FHT reactive and reassuring x 2   - TID EFM while on antepartum   - BPP T/F, next 9/30    # Tobacco use in pregnancy   - Offered nicotine replacement while inpatient; patient currently declines     # Candidiasis:   - S/p fluconazole 9/15 for concern for thrush, yeast infection     # Routine PNC   - GCT normal, GBS negative (a/o 9/20)  - Rh negative, s/p Rhogam 8/19, type and screen q72hrs   - S/p TdAP 9/2/22  - Ok to go outside in wheelchair with chaperone/partner    Patient seen and plan reviewed with Dr. Benjy Abad MD  ObGyn, PGY-3  09/29/2022 8:20 AM     Physician Attestation   IIram DO, saw and evaluated Osman Macy Jocelyn with the fellow.    I personally reviewed the vital signs, medications, labs and imaging.    My key history or physical exam findings:   Doing well.  No fever/chills. +FM x 2.  Leaking clear/yellow fluid, thin.  Having occas contractions but not regular.     Key management decisions made by me:   Continue current care for PPROM. Plan IOL on Sunday or sooner if clinically indicated.     NST: reactive x 2    Iram Burleson DO  Date of Service (when I saw the patient): 09/29/22    Time Spent on this Encounter   Iram PORTILLO DO, DO, spent a total of 15 minutes face to face or coordinating care of Osman Cavazos Santoskrystyna.  Over 50% of my time on the unit was spent counseling the patient and/or coordinating care regarding PPROM, Di/Di twins.

## 2022-09-30 ENCOUNTER — APPOINTMENT (OUTPATIENT)
Dept: ULTRASOUND IMAGING | Facility: CLINIC | Age: 32
End: 2022-09-30
Payer: COMMERCIAL

## 2022-09-30 PROCEDURE — 120N000002 HC R&B MED SURG/OB UMMC

## 2022-09-30 PROCEDURE — 76819 FETAL BIOPHYS PROFIL W/O NST: CPT

## 2022-09-30 PROCEDURE — 258N000003 HC RX IP 258 OP 636: Performed by: STUDENT IN AN ORGANIZED HEALTH CARE EDUCATION/TRAINING PROGRAM

## 2022-09-30 PROCEDURE — 250N000013 HC RX MED GY IP 250 OP 250 PS 637: Performed by: STUDENT IN AN ORGANIZED HEALTH CARE EDUCATION/TRAINING PROGRAM

## 2022-09-30 PROCEDURE — 250N000011 HC RX IP 250 OP 636: Performed by: STUDENT IN AN ORGANIZED HEALTH CARE EDUCATION/TRAINING PROGRAM

## 2022-09-30 PROCEDURE — 99231 SBSQ HOSP IP/OBS SF/LOW 25: CPT | Mod: 25 | Performed by: OBSTETRICS & GYNECOLOGY

## 2022-09-30 PROCEDURE — 76818 FETAL BIOPHYS PROFILE W/NST: CPT | Mod: 26 | Performed by: OBSTETRICS & GYNECOLOGY

## 2022-09-30 RX ADMIN — IRON SUCROSE 300 MG: 20 INJECTION, SOLUTION INTRAVENOUS at 12:04

## 2022-09-30 RX ADMIN — FAMOTIDINE 10 MG: 10 TABLET ORAL at 08:58

## 2022-09-30 RX ADMIN — PRENATAL VITAMINS-IRON FUMARATE 27 MG IRON-FOLIC ACID 0.8 MG TABLET 1 TABLET: at 08:58

## 2022-09-30 RX ADMIN — FAMOTIDINE 10 MG: 10 TABLET ORAL at 21:01

## 2022-09-30 ASSESSMENT — ACTIVITIES OF DAILY LIVING (ADL)
ADLS_ACUITY_SCORE: 18

## 2022-09-30 NOTE — PROVIDER NOTIFICATION
09/30/22 1100   Provider Notification   Provider Name/Title Dr. JAZMIN Abad   Method of Notification Electronic Page   Request Evaluate - Remote   Notification Reason Decels     Provider notified about decels, advised to keep monitor on for another hour.

## 2022-09-30 NOTE — PROVIDER NOTIFICATION
09/30/22 1428   Provider Notification   Provider Name/Title Dr. JAZMIN Abad (G3)   Method of Notification Electronic Page   Request Evaluate - Remote   Notification Reason Other (Comment)   IV infiltrated during iron transfusion. Infusion stopped, ice applied and arm elevated. Slight swelling, redness and pain noted. New IV placed in left forearm. IV iron restarted. Dr. Abad informed.

## 2022-09-30 NOTE — PROGRESS NOTES
Ondina from [employer] reports she spoke to Ernesto about meeting to get the keys for [place].  He stated he would call her when he arrived in [place] this afternoon but Ondina did not hear back from him.  Ondina can meet with the family on Monday to provide the keys to [place]ze as she is not available over the weekend. Ernesto is able to stay in [place] and in [facility] after baby delivers.    Deepa Aguilar  DSW, MSW, LICSW  Maternal Child Health

## 2022-09-30 NOTE — PROGRESS NOTES
This writer met with Mel at bedside.  She reports the plan is for the twins to be induced on Sunday.  Ernesto (her partner) is coming today for a visit and then will come on Sunday for the birth and post-partum period.  Osman is concerned about lodging when she is discharged.  She plans to breastfeed and needs to be close to the twins.  Ernesto will be coming 3-5 days a week with their son.  Osman plans to stay in [place] until the twins are discharged.  This writer spoke with [employer] to refer the family for a [place] apartment.  [employer] will connect with Ernesto to provide keys and move in instructions.  Osman reports feeling much relief knowing everything is in place prior to delivery.  She reports it is strange to know the day and time her labor will begin as with her son this was unknown.  She reports some anxiety knowing she will be induced on Sunday and is coping by talking to Ernesto, family and friends.     This writer will continue to follow this family through their NICU stay.    Deepa ATWOODW, MSW, [facility] Hospital[facility]  Maternal Child Health

## 2022-09-30 NOTE — PLAN OF CARE
Patient rested comfortably throughout the night. VSS, patient remains afebrile, and s/s of infection absent. Pt reports leaking a scant amount of yellow tinged fluid. Pt reports active fetal movement and feeling occasional contractions/abdominal cramping. Pt with contractions x2 during PM monitoring. With AM monitoring, pt reporting lower back pain and pelvic pressure. [place] reveals irritability and contraction x1. Encouraged oral hydration and voiding every 2 hours. See flowsheet for FHR information. Will continue to monitor and proceed with current plan of care. Patient's  will return for scheduled induction on Sunday.

## 2022-09-30 NOTE — PLAN OF CARE
Goal Outcome Evaluation:   Premature Rupture of Membranes  Data: VSS, Afebrile. Leaking scant  amounts of clear/yellowish fluid. Patient denies S/S of preeclampsia, peripheral IV infiltration during iron infusion, removed and placed, provider notified, continue monitor. Contraction pattern stable and within parameters, see flowsheet. Fetal assessment Reactive. Encourage active range of motion and frequent position changes.  Plan: Continue expectant management. Observe for and notify care provider of indicators of progressing labor, signs/symptoms of infection, or fetal/maternal compromise.

## 2022-09-30 NOTE — PROGRESS NOTES
"SPIRITUAL HEALTH SERVICES Progress Note  [facility] ([facility] [place] 4BOB    Saw pt Osman Macy Banks per follow-up visit.      Illness Narrative - Induction set for the weekend      Distress - No specific distress was communicated by the pt. She shared that there are positives and negatives of being induced over the weekend. Pt shared \"it helps to know that it's happening and planned, but that doesn't get rid of the jitters.\"      Coping - Osman's  is coming down and will have family with her on Sunday. She finds support through family and has had acupuncture before as an integrative therapy.       Plan - I will follow up with pt after she delivers and continue support as needed/requested.     Allegra Conley  Chaplain Resident  Pager [phone]    * [facility] remains available 24/7 for emergent requests/referrals, either by having the switchboard page the on-call  or by entering an ASAP/STAT consult in Epic (this will also page the on-call ). Routine Epic consults receive an initial response within 24 hours.*    "

## 2022-09-30 NOTE — PROGRESS NOTES
[facility] Antepartum Progress Note  2022     Subjective:   Osman is feeling fine this morning. Mild cramping overnight, but slept better than before. No night sweats last night. Denies regular contractions, vaginal bleeding.  Continues to leak watery yellow fluid.  Active fetal movement.  No additional concerns. Looking forward to IOL on .    Objective:  Patient Vitals for the past 24 hrs:   BP Temp Temp src Resp   22 0836 125/73 98.2  F (36.8  C) Oral 18   22 0600 128/75 98.3  F (36.8  C) Oral 18   22 0306 -- 98.1  F (36.7  C) Oral 16   22 2153 115/63 98.1  F (36.7  C) Oral 18   22 1930 117/63 98.5  F (36.9  C) Oral 18   22 1854 -- 98.3  F (36.8  C) Oral 18   22 1332 119/62 98.5  F (36.9  C) Oral 18     Gen: Resting comfortably, alert, oriented  CV: Regular rate  Resp: Normal work of breathing   Abd: Gravid, non-distended, non-tender to palpation     BPP:  10/10 Twin A, 8/10 Twin B     AM:   FHT: - Twin A: , moderate variability, + accels, no decels   - Twin B: , moderate variability, + accels, no decels  [place]:    Irritability    Assessment/Plan:  Osman Banks is a 32 year old  at 33w5d by LMP c/w 8w5d US who is HD#12 for PPROM on the morning of  in the setting of di-di twin gestation. She was initially admitted and was found to progress quickly to /-1; has since been stable without additional cervical change. No objective signs of intra-amniotic infection at this time. Planning for IOL at 34w0d.     # PPROM  # Threatened PTL  # Di-di twin gestation  - Amnisure positive  prior to transfer from OSH  - S/p 7D course of latency antibiotics with clinda/azithro  - S/p BMZ from - and s/p NICU consult   - Desires vaginal delivery; consented for C/S if indicated, including C/S for second twin if indicated.   - Planning BTL if requires C/S  - IOL scheduled for 10/2, 0800 at 34w0d    # Anemia  - Hgb 10.7, ferritin 12 as of  "  - S/p IV Venofer x1 ()     # Fetal Well Being  - FHT reactive and reassuring x 2   - TID EFM while on antepartum   - BPP T/F, next      # Candidiasis:   - S/p fluconazole 9/15 for concern for thrush, yeast infection     # Routine PNC   - GCT normal, GBS negative (a/o )  - Rh negative, s/p Rhogam , type and screen q72hrs   - S/p TdAP 22  - Ok to go outside in wheelchair with chaperone/partner    Patient seen and plan reviewed with Dr. Dolores Abad MD  ObGyn, PGY-3  2022 9:14 AM     MFM Attending Attestation  I saw this patient with the resident and agree with the resident's findings and plan of care as documented in the resident's note. I personally reviewed her vital signs, medications, labs, pertinent imaging, and fetal monitoring. In summary, Ms. Banks is a 32 year old  at 33w5d admitted in the setting of a DCDA twin gestation complicated by PPROM of Twin 1 at 32w1d. She has received latency antibiotics and BMZ on -. Upon presentation there was concern for concurrent  labor however she stalled at 4cm and has not had recurrent signs/symptoms of labor. There is no evidence of infection or abruption and both maternal and fetal status are stable and reassuring with a plan for IOL at 34 weeks (). The twins are cephalic/cephalic, > 1500g and concordantly grown. She is GBS negative.    /74   Pulse 98   Temp 98.3  F (36.8  C) (Oral)   Resp 18   Ht 1.676 m (5' 6\")   Wt 87.4 kg (192 lb 9.6 oz)   SpO2 99%   BMI 31.09 kg/m        FHT: Twin 1 Baseline 130s, moderate variability, +accels, no decels  Twin 2 Baseline 140s, moderate variability, +accels, no decels  [place]: Irritability, no contractions     NST interpretation for today: reactive, reassuring and appropriate for GA for both twins    Time Spent on this Encounter   I spent a total of 15 minutes face-to-face or coordinating care of Ms. Banks. Over 50% of my time on the unit was " spent counseling the patient and /or coordinating care regarding diagnosis, diagnostic results, prognosis and risks and benefits of treatment options.     Date of service (when I saw the patient): 9/30/2022     Chyna Bernal MD  , OB/GYN  Maternal-Fetal Medicine  [phone] (Pager)

## 2022-09-30 NOTE — PLAN OF CARE
Pt reports some cramping in lower abdomen. Unchanged from cramping she has been having. IV site of infiltrated IV improving. Redness, swelling and pain have improved. Pt has no other complaints. Continue to monitor.

## 2022-10-01 PROCEDURE — 99232 SBSQ HOSP IP/OBS MODERATE 35: CPT | Mod: 25 | Performed by: OBSTETRICS & GYNECOLOGY

## 2022-10-01 PROCEDURE — 59025 FETAL NON-STRESS TEST: CPT | Mod: 26 | Performed by: OBSTETRICS & GYNECOLOGY

## 2022-10-01 PROCEDURE — 120N000002 HC R&B MED SURG/OB UMMC

## 2022-10-01 PROCEDURE — 250N000013 HC RX MED GY IP 250 OP 250 PS 637: Performed by: STUDENT IN AN ORGANIZED HEALTH CARE EDUCATION/TRAINING PROGRAM

## 2022-10-01 RX ADMIN — FAMOTIDINE 10 MG: 10 TABLET ORAL at 20:36

## 2022-10-01 RX ADMIN — PRENATAL VITAMINS-IRON FUMARATE 27 MG IRON-FOLIC ACID 0.8 MG TABLET 1 TABLET: at 08:15

## 2022-10-01 RX ADMIN — FAMOTIDINE 10 MG: 10 TABLET ORAL at 08:15

## 2022-10-01 ASSESSMENT — ACTIVITIES OF DAILY LIVING (ADL)
ADLS_ACUITY_SCORE: 18

## 2022-10-01 NOTE — PLAN OF CARE
Goal Outcome Evaluation:    Plan of Care Reviewed With: patient, spouse     Overall Patient Progress: no change      Pt has no complaints. VSS. Afebrile. Denies LOF, vaginal bleeding, cramping or contractions. Endorses + FM x 2. Denies vision changes, RUQ pain, epigastric pain, shortness of breath, HA and increased swelling. FHT's AGA x 2. Contractions noted on monitor. Patient reports feeling slightly stronger cramping but tolerable (during afternoon monitoring). Cramping has lessened now. Patient aware to call nursing for any questions or concerns. Pt stable at this time. Continue with current POC.

## 2022-10-01 NOTE — PLAN OF CARE
Goal Outcome Evaluation:    Plan of Care Reviewed With: patient     Overall Patient Progress: no change    Pt admitted for PPROM of twin A and  labor. RN assumed care at 1900. VSS, EFM charted (see flowsheet), pt afebrile. Pt denies headache, vision changes, RUQ pain, bleeding, leaking of fluids, back pain. Pt comfortable in bed with support person, Saran, at bedside. Pt stated she has no questions or concerns with plan of care at this time. Plan per provider is IOL 10/2. Call light within reach. Report given to Asha QUIROZ.

## 2022-10-01 NOTE — PROGRESS NOTES
[facility] Antepartum Progress Note  10/1/2022    Subjective:   Doing well today, no concerns at this time. Excited for delivery tomorrow. Denies worsening cramping or other abdominal pain. No fever/chills. No bleeding. Normal fetal movement x2.    Objective:  Patient Vitals for the past 24 hrs:   BP Temp Temp src Resp   10/01/22 1021 118/71 98.5  F (36.9  C) Oral 16   10/01/22 0817 -- 98.7  F (37.1  C) Oral --   10/01/22 0607 112/68 -- -- --   10/01/22 0200 -- 98.3  F (36.8  C) Oral --   22 2200 126/59 98.1  F (36.7  C) Oral 16   22 126/77 98  F (36.7  C) Oral 18   22 1619 -- 98.4  F (36.9  C) Oral --   22 1418 126/74 -- -- --   22 1407 113/70 -- -- --   22 1306 119/60 -- -- --   22 1251 123/67 -- -- --   22 1236 117/62 -- -- --   22 1221 118/66 98.3  F (36.8  C) Oral 18     Gen: Resting comfortably, alert, oriented  CV: Regular rate  Resp: Normal work of breathing   Abd: Gravid, non-distended, non-tender to palpation     BPP:  10/10 Twin A, 8/10 Twin B    10/1 AM:   FHT: - Twin A: , moderate variability, + accels, no decels   - Twin B: , moderate variability, + accels, no decels  [place]:    Irritability, no ctx    Assessment/Plan:  Osman Banks is a 32 year old  at 33w6d by LMP c/w 8w5d US who is HD#13 for PPROM on the morning of  in the setting of di-di twin gestation. She was initially admitted and was found to progress quickly to /-; has since been stable without additional cervical change. No objective signs of intra-amniotic infection at this time. Planning for IOL at 34w0d.     # PPROM  # Threatened PTL  # Di-di twin gestation  - Amnisure positive  prior to transfer from OSH  - S/p 7D course of latency antibiotics with clinda/azithro  - S/p BMZ from - and s/p NICU consult   - Desires vaginal delivery; consented for C/S if indicated, including C/S for second twin if indicated.   - Planning BTL if requires  C/S. If vaginal delivery, DOES NOT desire BTL and will plan for contraception planning with her primary clinic, likely [facility].  - IOL scheduled for 10/2, 0800 at 34w0d    # Anemia  - Hgb 10.7, ferritin 12 as of   - S/p IV Venofer x1 ()     # Fetal Well Being  - FHT reactive and reassuring x 2   - TID EFM while on antepartum      # Candidiasis:   - S/p fluconazole 9/15 for concern for thrush, yeast infection     # Routine PNC   - GCT normal, GBS negative (a/o )  - Rh negative, s/p Rhogam , type and screen q72hrs   - S/p TdAP 22  - Ok to go outside in wheelchair with chaperone/partner    Patient seen and plan reviewed with Dr. Dolores Malin MD  ObGyn, PGY-3  10/01/2022 12:19 PM     MFM Attending Attestation  I saw this patient with the resident and agree with the resident's findings and plan of care as documented in the resident's note. I personally reviewed her vital signs, medications, labs, pertinent imaging, and fetal monitoring. In summary, Ms. Banks is a 32 year old  at 33w5d admitted in the setting of a DCDA twin gestation complicated by PPROM of Twin 1 at 32w1d. She has received latency antibiotics and BMZ on -. Upon presentation there was concern for concurrent  labor however she stalled at 4cm and has not had recurrent signs/symptoms of labor. There is no evidence of infection or abruption and both maternal and fetal status are stable and reassuring with a plan for IOL tomorrow at 34 weeks. The twins are cephalic/cephalic, > 1500g and concordantly grown. We reviewed mode of delivery and risks and benefits in the setting of twins including breech extraction and possibilities of vaginal delivery of Twin 1 and  of Twin 2. She desires vaginal delivery and breech extraction if indicated. She is GBS negative. She desires a tubal ligation in the event of a .    /71   Pulse 98   Temp 98.5  F (36.9  C) (Oral)   Resp 16   Ht 1.676 m  "(5' 6\")   Wt 87.4 kg (192 lb 9.6 oz)   SpO2 99%   BMI 31.09 kg/m        FHT: Twin 1 Baseline 130s, moderate variability, +accels, no decels  Twin 2 Baseline 140s, moderate variability, +accels, no decels  TOCO: No contractions      Time Spent on this Encounter   I spent a total of 25 minutes face-to-face or coordinating care of Ms. Banks. Over 50% of my time on the unit was spent counseling the patient and /or coordinating care regarding diagnosis, diagnostic results, prognosis and risks and benefits of treatment options.     Date of service (when I saw the patient): 10/1/2022     Chyna Bernal MD  , OB/GYN  Maternal-Fetal Medicine  [phone] (Pager)         "

## 2022-10-02 ENCOUNTER — ANESTHESIA EVENT (OUTPATIENT)
Dept: OBGYN | Facility: CLINIC | Age: 32
End: 2022-10-02
Payer: COMMERCIAL

## 2022-10-02 ENCOUNTER — ANESTHESIA (OUTPATIENT)
Dept: OBGYN | Facility: CLINIC | Age: 32
End: 2022-10-02
Payer: COMMERCIAL

## 2022-10-02 LAB
ABO/RH(D): ABNORMAL
ANTIBODY ID: NORMAL
ANTIBODY SCREEN: POSITIVE
ERYTHROCYTE [DISTWIDTH] IN BLOOD BY AUTOMATED COUNT: 13.6 % (ref 10–15)
HCT VFR BLD AUTO: 34.9 % (ref 35–47)
HGB BLD-MCNC: 11.1 G/DL (ref 11.7–15.7)
MCH RBC QN AUTO: 29.3 PG (ref 26.5–33)
MCHC RBC AUTO-ENTMCNC: 31.8 G/DL (ref 31.5–36.5)
MCV RBC AUTO: 92 FL (ref 78–100)
PLATELET # BLD AUTO: 176 10E3/UL (ref 150–450)
RBC # BLD AUTO: 3.79 10E6/UL (ref 3.8–5.2)
SPECIMEN EXPIRATION DATE: ABNORMAL
SPECIMEN EXPIRATION DATE: NORMAL
WBC # BLD AUTO: 15 10E3/UL (ref 4–11)

## 2022-10-02 PROCEDURE — 250N000013 HC RX MED GY IP 250 OP 250 PS 637: Performed by: STUDENT IN AN ORGANIZED HEALTH CARE EDUCATION/TRAINING PROGRAM

## 2022-10-02 PROCEDURE — 120N000002 HC R&B MED SURG/OB UMMC

## 2022-10-02 PROCEDURE — 36415 COLL VENOUS BLD VENIPUNCTURE: CPT | Performed by: STUDENT IN AN ORGANIZED HEALTH CARE EDUCATION/TRAINING PROGRAM

## 2022-10-02 PROCEDURE — 86901 BLOOD TYPING SEROLOGIC RH(D): CPT | Performed by: STUDENT IN AN ORGANIZED HEALTH CARE EDUCATION/TRAINING PROGRAM

## 2022-10-02 PROCEDURE — 250N000011 HC RX IP 250 OP 636

## 2022-10-02 PROCEDURE — 250N000011 HC RX IP 250 OP 636: Performed by: STUDENT IN AN ORGANIZED HEALTH CARE EDUCATION/TRAINING PROGRAM

## 2022-10-02 PROCEDURE — 59409 OBSTETRICAL CARE: CPT | Mod: 59 | Performed by: OBSTETRICS & GYNECOLOGY

## 2022-10-02 PROCEDURE — 3E0R3BZ INTRODUCTION OF ANESTHETIC AGENT INTO SPINAL CANAL, PERCUTANEOUS APPROACH: ICD-10-PCS | Performed by: STUDENT IN AN ORGANIZED HEALTH CARE EDUCATION/TRAINING PROGRAM

## 2022-10-02 PROCEDURE — 258N000003 HC RX IP 258 OP 636

## 2022-10-02 PROCEDURE — 722N000002 HC LABOR CARE VAGINAL DELIVERY MULT

## 2022-10-02 PROCEDURE — 370N000003 HC ANESTHESIA WARD SERVICE

## 2022-10-02 PROCEDURE — 00HU33Z INSERTION OF INFUSION DEVICE INTO SPINAL CANAL, PERCUTANEOUS APPROACH: ICD-10-PCS | Performed by: STUDENT IN AN ORGANIZED HEALTH CARE EDUCATION/TRAINING PROGRAM

## 2022-10-02 PROCEDURE — 3E033VJ INTRODUCTION OF OTHER HORMONE INTO PERIPHERAL VEIN, PERCUTANEOUS APPROACH: ICD-10-PCS | Performed by: OBSTETRICS & GYNECOLOGY

## 2022-10-02 PROCEDURE — 258N000003 HC RX IP 258 OP 636: Performed by: STUDENT IN AN ORGANIZED HEALTH CARE EDUCATION/TRAINING PROGRAM

## 2022-10-02 PROCEDURE — 250N000009 HC RX 250: Performed by: STUDENT IN AN ORGANIZED HEALTH CARE EDUCATION/TRAINING PROGRAM

## 2022-10-02 PROCEDURE — 86870 RBC ANTIBODY IDENTIFICATION: CPT | Performed by: STUDENT IN AN ORGANIZED HEALTH CARE EDUCATION/TRAINING PROGRAM

## 2022-10-02 PROCEDURE — 85014 HEMATOCRIT: CPT | Performed by: STUDENT IN AN ORGANIZED HEALTH CARE EDUCATION/TRAINING PROGRAM

## 2022-10-02 PROCEDURE — 10907ZC DRAINAGE OF AMNIOTIC FLUID, THERAPEUTIC FROM PRODUCTS OF CONCEPTION, VIA NATURAL OR ARTIFICIAL OPENING: ICD-10-PCS | Performed by: OBSTETRICS & GYNECOLOGY

## 2022-10-02 PROCEDURE — 999N000016 HC STATISTIC ATTENDANCE AT DELIVERY

## 2022-10-02 RX ORDER — BISACODYL 10 MG
10 SUPPOSITORY, RECTAL RECTAL DAILY PRN
Status: DISCONTINUED | OUTPATIENT
Start: 2022-10-02 | End: 2022-10-04 | Stop reason: HOSPADM

## 2022-10-02 RX ORDER — MISOPROSTOL 200 UG/1
TABLET ORAL
Status: DISCONTINUED
Start: 2022-10-02 | End: 2022-10-02 | Stop reason: HOSPADM

## 2022-10-02 RX ORDER — NALOXONE HYDROCHLORIDE 0.4 MG/ML
0.2 INJECTION, SOLUTION INTRAMUSCULAR; INTRAVENOUS; SUBCUTANEOUS
Status: DISCONTINUED | OUTPATIENT
Start: 2022-10-02 | End: 2022-10-03

## 2022-10-02 RX ORDER — NALOXONE HYDROCHLORIDE 0.4 MG/ML
0.4 INJECTION, SOLUTION INTRAMUSCULAR; INTRAVENOUS; SUBCUTANEOUS
Status: DISCONTINUED | OUTPATIENT
Start: 2022-10-02 | End: 2022-10-03

## 2022-10-02 RX ORDER — IBUPROFEN 800 MG/1
800 TABLET, FILM COATED ORAL EVERY 6 HOURS PRN
Status: DISCONTINUED | OUTPATIENT
Start: 2022-10-02 | End: 2022-10-04 | Stop reason: HOSPADM

## 2022-10-02 RX ORDER — OXYTOCIN 10 [USP'U]/ML
INJECTION, SOLUTION INTRAMUSCULAR; INTRAVENOUS
Status: DISCONTINUED
Start: 2022-10-02 | End: 2022-10-02 | Stop reason: HOSPADM

## 2022-10-02 RX ORDER — OXYTOCIN 10 [USP'U]/ML
10 INJECTION, SOLUTION INTRAMUSCULAR; INTRAVENOUS
Status: DISCONTINUED | OUTPATIENT
Start: 2022-10-02 | End: 2022-10-04 | Stop reason: HOSPADM

## 2022-10-02 RX ORDER — FENTANYL CITRATE 50 UG/ML
100 INJECTION, SOLUTION INTRAMUSCULAR; INTRAVENOUS
Status: DISCONTINUED | OUTPATIENT
Start: 2022-10-02 | End: 2022-10-03

## 2022-10-02 RX ORDER — METHYLERGONOVINE MALEATE 0.2 MG/ML
200 INJECTION INTRAVENOUS
Status: DISCONTINUED | OUTPATIENT
Start: 2022-10-02 | End: 2022-10-03

## 2022-10-02 RX ORDER — OXYTOCIN/0.9 % SODIUM CHLORIDE 30/500 ML
340 PLASTIC BAG, INJECTION (ML) INTRAVENOUS CONTINUOUS PRN
Status: DISCONTINUED | OUTPATIENT
Start: 2022-10-02 | End: 2022-10-03

## 2022-10-02 RX ORDER — ACETAMINOPHEN 325 MG/1
650 TABLET ORAL EVERY 4 HOURS PRN
Status: DISCONTINUED | OUTPATIENT
Start: 2022-10-02 | End: 2022-10-03

## 2022-10-02 RX ORDER — ACETAMINOPHEN 325 MG/1
650 TABLET ORAL EVERY 4 HOURS PRN
Status: DISCONTINUED | OUTPATIENT
Start: 2022-10-02 | End: 2022-10-04 | Stop reason: HOSPADM

## 2022-10-02 RX ORDER — SODIUM CHLORIDE, SODIUM LACTATE, POTASSIUM CHLORIDE, CALCIUM CHLORIDE 600; 310; 30; 20 MG/100ML; MG/100ML; MG/100ML; MG/100ML
INJECTION, SOLUTION INTRAVENOUS
Status: DISCONTINUED
Start: 2022-10-02 | End: 2022-10-02 | Stop reason: HOSPADM

## 2022-10-02 RX ORDER — PROCHLORPERAZINE MALEATE 10 MG
10 TABLET ORAL EVERY 6 HOURS PRN
Status: DISCONTINUED | OUTPATIENT
Start: 2022-10-02 | End: 2022-10-03

## 2022-10-02 RX ORDER — PROCHLORPERAZINE 25 MG
25 SUPPOSITORY, RECTAL RECTAL EVERY 12 HOURS PRN
Status: DISCONTINUED | OUTPATIENT
Start: 2022-10-02 | End: 2022-10-03

## 2022-10-02 RX ORDER — OXYTOCIN/0.9 % SODIUM CHLORIDE 30/500 ML
100-340 PLASTIC BAG, INJECTION (ML) INTRAVENOUS CONTINUOUS PRN
Status: DISCONTINUED | OUTPATIENT
Start: 2022-10-02 | End: 2022-10-03

## 2022-10-02 RX ORDER — MISOPROSTOL 200 UG/1
800 TABLET ORAL
Status: DISCONTINUED | OUTPATIENT
Start: 2022-10-02 | End: 2022-10-04 | Stop reason: HOSPADM

## 2022-10-02 RX ORDER — IBUPROFEN 800 MG/1
800 TABLET, FILM COATED ORAL
Status: DISCONTINUED | OUTPATIENT
Start: 2022-10-02 | End: 2022-10-03

## 2022-10-02 RX ORDER — MISOPROSTOL 200 UG/1
400 TABLET ORAL
Status: DISCONTINUED | OUTPATIENT
Start: 2022-10-02 | End: 2022-10-04 | Stop reason: HOSPADM

## 2022-10-02 RX ORDER — LIDOCAINE HYDROCHLORIDE 10 MG/ML
INJECTION, SOLUTION EPIDURAL; INFILTRATION; INTRACAUDAL; PERINEURAL
Status: DISCONTINUED
Start: 2022-10-02 | End: 2022-10-02 | Stop reason: HOSPADM

## 2022-10-02 RX ORDER — OXYTOCIN/0.9 % SODIUM CHLORIDE 30/500 ML
1-24 PLASTIC BAG, INJECTION (ML) INTRAVENOUS CONTINUOUS
Status: DISCONTINUED | OUTPATIENT
Start: 2022-10-02 | End: 2022-10-03

## 2022-10-02 RX ORDER — LIDOCAINE HYDROCHLORIDE AND EPINEPHRINE 15; 5 MG/ML; UG/ML
3 INJECTION, SOLUTION EPIDURAL
Status: DISCONTINUED | OUTPATIENT
Start: 2022-10-02 | End: 2022-10-03

## 2022-10-02 RX ORDER — FENTANYL CITRATE-0.9 % NACL/PF 10 MCG/ML
100 PLASTIC BAG, INJECTION (ML) INTRAVENOUS EVERY 5 MIN PRN
Status: DISCONTINUED | OUTPATIENT
Start: 2022-10-02 | End: 2022-10-03

## 2022-10-02 RX ORDER — TRANEXAMIC ACID 10 MG/ML
1 INJECTION, SOLUTION INTRAVENOUS EVERY 30 MIN PRN
Status: DISCONTINUED | OUTPATIENT
Start: 2022-10-02 | End: 2022-10-04 | Stop reason: HOSPADM

## 2022-10-02 RX ORDER — METHYLERGONOVINE MALEATE 0.2 MG/ML
200 INJECTION INTRAVENOUS
Status: DISCONTINUED | OUTPATIENT
Start: 2022-10-02 | End: 2022-10-04 | Stop reason: HOSPADM

## 2022-10-02 RX ORDER — MISOPROSTOL 200 UG/1
400 TABLET ORAL
Status: DISCONTINUED | OUTPATIENT
Start: 2022-10-02 | End: 2022-10-03

## 2022-10-02 RX ORDER — NALBUPHINE HYDROCHLORIDE 10 MG/ML
2.5-5 INJECTION, SOLUTION INTRAMUSCULAR; INTRAVENOUS; SUBCUTANEOUS EVERY 6 HOURS PRN
Status: DISCONTINUED | OUTPATIENT
Start: 2022-10-02 | End: 2022-10-03

## 2022-10-02 RX ORDER — LIDOCAINE 40 MG/G
CREAM TOPICAL
Status: DISCONTINUED | OUTPATIENT
Start: 2022-10-02 | End: 2022-10-03

## 2022-10-02 RX ORDER — OXYTOCIN/0.9 % SODIUM CHLORIDE 30/500 ML
340 PLASTIC BAG, INJECTION (ML) INTRAVENOUS CONTINUOUS PRN
Status: DISCONTINUED | OUTPATIENT
Start: 2022-10-02 | End: 2022-10-04 | Stop reason: HOSPADM

## 2022-10-02 RX ORDER — METOCLOPRAMIDE HYDROCHLORIDE 5 MG/ML
10 INJECTION INTRAMUSCULAR; INTRAVENOUS EVERY 6 HOURS PRN
Status: DISCONTINUED | OUTPATIENT
Start: 2022-10-02 | End: 2022-10-03

## 2022-10-02 RX ORDER — OXYTOCIN 10 [USP'U]/ML
10 INJECTION, SOLUTION INTRAMUSCULAR; INTRAVENOUS
Status: DISCONTINUED | OUTPATIENT
Start: 2022-10-02 | End: 2022-10-03

## 2022-10-02 RX ORDER — SODIUM CHLORIDE, SODIUM LACTATE, POTASSIUM CHLORIDE, CALCIUM CHLORIDE 600; 310; 30; 20 MG/100ML; MG/100ML; MG/100ML; MG/100ML
INJECTION, SOLUTION INTRAVENOUS CONTINUOUS PRN
Status: DISCONTINUED | OUTPATIENT
Start: 2022-10-02 | End: 2022-10-03

## 2022-10-02 RX ORDER — DOCUSATE SODIUM 100 MG/1
100 CAPSULE, LIQUID FILLED ORAL DAILY
Status: DISCONTINUED | OUTPATIENT
Start: 2022-10-03 | End: 2022-10-04 | Stop reason: HOSPADM

## 2022-10-02 RX ORDER — METOCLOPRAMIDE 10 MG/1
10 TABLET ORAL EVERY 6 HOURS PRN
Status: DISCONTINUED | OUTPATIENT
Start: 2022-10-02 | End: 2022-10-03

## 2022-10-02 RX ORDER — HYDROCORTISONE 25 MG/G
CREAM TOPICAL 3 TIMES DAILY PRN
Status: DISCONTINUED | OUTPATIENT
Start: 2022-10-02 | End: 2022-10-04 | Stop reason: HOSPADM

## 2022-10-02 RX ORDER — FENTANYL/ROPIVACAINE/NS/PF 2MCG/ML-.1
PLASTIC BAG, INJECTION (ML) EPIDURAL
Status: DISCONTINUED | OUTPATIENT
Start: 2022-10-02 | End: 2022-10-03

## 2022-10-02 RX ORDER — CITRIC ACID/SODIUM CITRATE 334-500MG
30 SOLUTION, ORAL ORAL
Status: DISCONTINUED | OUTPATIENT
Start: 2022-10-02 | End: 2022-10-03

## 2022-10-02 RX ORDER — KETOROLAC TROMETHAMINE 30 MG/ML
30 INJECTION, SOLUTION INTRAMUSCULAR; INTRAVENOUS
Status: DISCONTINUED | OUTPATIENT
Start: 2022-10-02 | End: 2022-10-03

## 2022-10-02 RX ORDER — CARBOPROST TROMETHAMINE 250 UG/ML
250 INJECTION, SOLUTION INTRAMUSCULAR
Status: DISCONTINUED | OUTPATIENT
Start: 2022-10-02 | End: 2022-10-04 | Stop reason: HOSPADM

## 2022-10-02 RX ORDER — CITRIC ACID/SODIUM CITRATE 334-500MG
30 SOLUTION, ORAL ORAL ONCE
Status: DISCONTINUED | OUTPATIENT
Start: 2022-10-02 | End: 2022-10-03

## 2022-10-02 RX ORDER — ONDANSETRON 4 MG/1
4 TABLET, ORALLY DISINTEGRATING ORAL EVERY 6 HOURS PRN
Status: DISCONTINUED | OUTPATIENT
Start: 2022-10-02 | End: 2022-10-03

## 2022-10-02 RX ORDER — CARBOPROST TROMETHAMINE 250 UG/ML
250 INJECTION, SOLUTION INTRAMUSCULAR
Status: DISCONTINUED | OUTPATIENT
Start: 2022-10-02 | End: 2022-10-03

## 2022-10-02 RX ORDER — MISOPROSTOL 200 UG/1
800 TABLET ORAL
Status: DISCONTINUED | OUTPATIENT
Start: 2022-10-02 | End: 2022-10-03

## 2022-10-02 RX ORDER — SODIUM CHLORIDE, SODIUM LACTATE, POTASSIUM CHLORIDE, CALCIUM CHLORIDE 600; 310; 30; 20 MG/100ML; MG/100ML; MG/100ML; MG/100ML
INJECTION, SOLUTION INTRAVENOUS
Status: COMPLETED
Start: 2022-10-02 | End: 2022-10-02

## 2022-10-02 RX ORDER — TRANEXAMIC ACID 10 MG/ML
INJECTION, SOLUTION INTRAVENOUS
Status: DISCONTINUED
Start: 2022-10-02 | End: 2022-10-02 | Stop reason: WASHOUT

## 2022-10-02 RX ORDER — ONDANSETRON 2 MG/ML
4 INJECTION INTRAMUSCULAR; INTRAVENOUS EVERY 6 HOURS PRN
Status: DISCONTINUED | OUTPATIENT
Start: 2022-10-02 | End: 2022-10-03

## 2022-10-02 RX ORDER — MODIFIED LANOLIN
OINTMENT (GRAM) TOPICAL
Status: DISCONTINUED | OUTPATIENT
Start: 2022-10-02 | End: 2022-10-04 | Stop reason: HOSPADM

## 2022-10-02 RX ADMIN — SODIUM CHLORIDE, POTASSIUM CHLORIDE, SODIUM LACTATE AND CALCIUM CHLORIDE 500 ML: 600; 310; 30; 20 INJECTION, SOLUTION INTRAVENOUS at 10:35

## 2022-10-02 RX ADMIN — MISOPROSTOL 800 MCG: 200 TABLET ORAL at 23:01

## 2022-10-02 RX ADMIN — FAMOTIDINE 10 MG: 10 TABLET ORAL at 11:18

## 2022-10-02 RX ADMIN — KETOROLAC TROMETHAMINE 30 MG: 30 INJECTION, SOLUTION INTRAMUSCULAR at 23:28

## 2022-10-02 RX ADMIN — Medication 2 MILLI-UNITS/MIN: at 11:53

## 2022-10-02 RX ADMIN — SODIUM CHLORIDE, POTASSIUM CHLORIDE, SODIUM LACTATE AND CALCIUM CHLORIDE: 600; 310; 30; 20 INJECTION, SOLUTION INTRAVENOUS at 15:41

## 2022-10-02 RX ADMIN — Medication: at 11:29

## 2022-10-02 RX ADMIN — BUPIVACAINE HYDROCHLORIDE 10 ML: 2.5 INJECTION, SOLUTION EPIDURAL; INFILTRATION; INTRACAUDAL at 11:30

## 2022-10-02 ASSESSMENT — ACTIVITIES OF DAILY LIVING (ADL)
ADLS_ACUITY_SCORE: 18
ADLS_ACUITY_SCORE: 20
ADLS_ACUITY_SCORE: 20
ADLS_ACUITY_SCORE: 18

## 2022-10-02 ASSESSMENT — LIFESTYLE VARIABLES: TOBACCO_USE: 0

## 2022-10-02 NOTE — ANESTHESIA PREPROCEDURE EVALUATION
Anesthesia Pre-Procedure Evaluation    Patient: Osman Banks   MRN: [id number] : 1990        Procedure : * No procedures listed *          Past Medical History:   Diagnosis Date     PCOS (polycystic ovarian syndrome)      Tobacco use       Past Surgical History:   Procedure Laterality Date     TOOTH EXTRACTION        Allergies   Allergen Reactions     Penicillins Swelling     Throat swelling     Morphine Other (See Comments)      Social History     Tobacco Use     Smoking status: Not on file     Smokeless tobacco: Not on file   Substance Use Topics     Alcohol use: Not on file      Wt Readings from Last 1 Encounters:   22 87.4 kg (192 lb 9.6 oz)        Anesthesia Evaluation   Pt has had prior anesthetic.         ROS/MED HX  ENT/Pulmonary: Comment: Tobacco use   (-) tobacco use   Neurologic:  - neg neurologic ROS     Cardiovascular:  - neg cardiovascular ROS     METS/Exercise Tolerance: >4 METS    Hematologic:  - neg hematologic  ROS     Musculoskeletal:  - neg musculoskeletal ROS     GI/Hepatic:  - neg GI/hepatic ROS     Renal/Genitourinary:  - neg Renal ROS     Endo: Comment:   PCOS - neg endo ROS     Psychiatric/Substance Use:  - neg psychiatric ROS     Infectious Disease:  - neg infectious disease ROS     Malignancy:  - neg malignancy ROS     Other: Comment:   , with  x1.   Now:  premature rupture of membranes in third trimester  Twin pregnancy, dichorionic/diamniotic,          (+) , twin IUP,         Physical Exam    Airway        Mallampati: II   TM distance: > 3 FB   Neck ROM: full   Mouth opening: > 3 cm    Respiratory Devices and Support         Dental  no notable dental history         Cardiovascular   cardiovascular exam normal          Pulmonary   pulmonary exam normal                OUTSIDE LABS:  CBC:   Lab Results   Component Value Date    WBC 15.0 (H) 10/02/2022    WBC 15.0 (H) 2022    HGB 11.1 (L) 10/02/2022    HGB 10.7 (L) 2022    HCT 34.9 (L)  10/02/2022    HCT 32.9 (L) 09/26/2022     10/02/2022     09/26/2022     BMP:   Lab Results   Component Value Date    CR 0.45 (L) 09/21/2022    CR 0.47 (L) 09/19/2022     COAGS: No results found for: PTT, INR, FIBR  POC: No results found for: BGM, HCG, HCGS  HEPATIC: No results found for: ALBUMIN, PROTTOTAL, ALT, AST, GGT, ALKPHOS, BILITOTAL, BILIDIRECT, MARBELLA  OTHER: No results found for: PH, LACT, A1C, ADENIKE, PHOS, MAG, LIPASE, AMYLASE, TSH, T4, T3, CRP, SED    Anesthesia Plan    ASA Status:  2, emergent    NPO Status:  ELEVATED Aspiration Risk/Unknown    Anesthesia Type: Epidural.              Consents    Anesthesia Plan(s) and associated risks, benefits, and realistic alternatives discussed. Questions answered and patient/representative(s) expressed understanding.    - Discussed:     - Discussed with:  Patient         Postoperative Care    Pain management: Neuraxial analgesia.        Comments:                Sherley Lyons MD

## 2022-10-02 NOTE — PLAN OF CARE
Brought pt over to L&D for induction at 1000. Applied monitors. Dr. Johnson did bedside ultrasound both cephalic/ cephalic. Started LR bolus in preparation for epidural placement.

## 2022-10-02 NOTE — PROGRESS NOTES
Labor Progress Note     S: Comfortable with epidural, starting to feel some increased pain with last increase in pitocin.    O:  Vitals:    10/02/22 1635 10/02/22 1700 10/02/22 1735 10/02/22 1800   BP: 116/70  120/71    BP Location: Right arm      Patient Position: Semi-Steven's      Cuff Size: Adult Regular      Pulse:       Resp: 16 18 16    Temp: 98.1  F (36.7  C)  98.2  F (36.8  C)    TempSrc: Oral  Oral    SpO2: 99% 98% 98% 100%   Weight:       Height:         General: NAD  SVE: 4.5/90/-2, forebag palpated > AROM after verbal consent with clear fluid     FHT: A - Baseline 135, moderate variability, accelerations present, no decelerations  B - Baseline 140, moderate variability, accelerations present, no decelerations  TOCO: 4-5 contractions in ten minutes    A/P: 32 year old  at 34w0d, admitted with PPROM, s/p 13 day antepartum stay now undergoing IOL. Pregnancy notable for di-di twins, Rh negative, history of gHTN, tobacco use.    - Induction: Continue pitocin, titrate per protocol.  - FWB: Category I for both twins, reactive and reassuring.  - GBS negative: no abx indicated    Anticipate vaginal delivery as both twins cephalic/cephalic with concordant growth. Will deliver in OR. Pt consented for breech extraction, forceps delivery, or  delivery if necessary.      Mray Ballard MD  OB/GYN Resident PGY-3  6:49 PM 2022

## 2022-10-02 NOTE — PROGRESS NOTES
[facility]  Labor Progress Note    S: Patient is feeling well. No concerns at this time. Not feeling contractions currently. Reviewed plan for delivery. Patient is hoping for epidural as soon as possible because she is anticipating fast progression.    O:   Patient Vitals for the past 4 hrs:   BP Temp Temp src   10/02/22 0955 (!) 141/82 98.3  F (36.8  C) Oral     Gen: NAD, well appearing  SVE: /-2 (140)    FHT:   A: Baseline 140, mod variability, + accelerations, no decelerations  B: Baseline 150, mod variability, + accelerations, no declerations  [place]: 0 contractions in 10 minutes    A/P:  Ms. Osman Banks is a 32 year old  at 34w0d by LMP c/w 8w5d US who is HD#13 for PPROM on the morning of  in the setting of di-di twin gestation. She was initially admitted and was found to progress quickly to 1; has since been stable without additional cervical change. No objective signs of intra-amniotic infection at this time. Planning for IOL today.    # IOL  - Cervix: /-2 ( 014)  - Membranes: Ruptured  - Plan: Start pitocin  - Pain: Desires epidural    # FWB   - Category 1 FHT x2, reactive and reassuring  - GBS: Negative    Anticipate     Iram Johnson MD  OB/GYN PGY-1

## 2022-10-02 NOTE — PROVIDER NOTIFICATION
10/02/22 1322   Provider Notification   Provider Name/Title Dr Abad   Method of Notification In Department   Notification Reason Labor Status   Currently solomon q3-5minutes, pitocin at 6mu/min, will continue to titrate. Pt resting comfortably with epidural. Afebrile, nontender, scant amniotic fluid. Category 1 tracing x2.

## 2022-10-02 NOTE — ANESTHESIA PROCEDURE NOTES
Epidural catheter Procedure Note    Pre-Procedure   Staff -        Anesthesiologist:  Mio Green MD       Resident/Fellow: Sherley Lyons MD       Performed By: with residents       Procedure performed by resident/fellow/CRNA in presence of a teaching physician.         Location: OB       Pre-Anesthestic Checklist: patient identified, IV checked, risks and benefits discussed, informed consent, monitors and equipment checked, pre-op evaluation, at physician/surgeon's request and post-op pain management  Timeout:       Correct Patient: Yes        Correct Procedure: Yes        Correct Site: Yes        Correct Position: Yes   Procedure Documentation  Procedure: epidural catheter       Patient Position: sitting       Skin prep: DuraPrep and Chloraprep       Local skin infiltrated with mL of 2% lidocaine.        Insertion Site: L3-4. (midline approach).       Technique: LORT saline        NATALIA at 6 cm.       Needle Type: [employer] needle       Needle Gauge: 17.        Needle Length (Inches): 3.5        Catheter: 18 G.          Catheter threaded easily.         5 cm epidural space.         Threaded 11 cm at skin.         # of attempts: 1 and  # of redirects:  2    Assessment/Narrative         Paresthesias: Resolved.       Test dose of 3 mL lidocaine 1.5% w/ 1:200,000 epinephrine at 11:14 CDT.         Test dose negative, 3 minutes after injection, for signs of intravascular, subdural, or intrathecal injection.       Insertion/Infusion Method: LORT saline       No aspiration negative for Heme or CSF via Epidural Catheter.       Sensory Level Left: T10.       Sensory Level Right: T10.    Medication(s) Administered   0.125% bupivacaine 5 mL + fentanyl 20 mcg + NS 5 mL (Epidural) (Mixture components: bupivacaine HCl (PF) 0.25 % Soln, 5 mL; fentaNYL (PF) 100 MCG/2ML Soln, 20 mcg; sodium chloride 0.9 % Soln, 5 mL) - EPIDURAL   10 mL - 10/2/2022 11:30:00 AM

## 2022-10-02 NOTE — PROGRESS NOTES
"Labor Progress Note     S: Feeling comfortable with epidural. Some tightening with contractions.    O:  /70   Pulse 98   Temp 98.1  F (36.7  C) (Oral)   Resp 20   Ht 1.676 m (5' 6\")   Wt 87.4 kg (192 lb 9.6 oz)   SpO2 99%   BMI 31.09 kg/m    General: NAD  VE: deferred    FHT: A - Baseline 125, moderate variability, accelerations present, no decelerations  B - Baseline 135, moderate variability, accelerations present, no decelerations  TOCO: 3 contractions in ten minutes    A/P: 32 year old  at 34w0d, admitted with PPROM, s/p 13 day antepartum stay now undergoing IOL. Pregnancy notable for di-di twins, Rh negative, history of gHTN, tobacco use.    - Induction: Continue pitocin, titrate per protocol.  - FWB: Category I for both twins, reactive and reassuring.  - GBS negative: no abx indicated    Anticipate vaginal delivery as both twins cephalic/cephalic with concordant growth. Will deliver in OR. Pt consented for breech extraction, forceps delivery, or  delivery if necessary.    Pearl Lehman MD  ObGyn, PGY-3  10/02/2022 3:14 PM      Addendum:  SVE performed at 1530. Unchanged at /2. Will continue to uptitrate pitocin.    Iram Johnson MD  OB/GYN PGY-1    "

## 2022-10-02 NOTE — PROVIDER NOTIFICATION
10/02/22 1200   Provider Notification   Provider Name/Title Dr High   Method of Notification At Bedside   Notification Reason Status Update   Dr High at bedside to meet patient. Epidural placed at 1130, pt tolerated well. Pitocin initiated now. Reviewed plan of care with patient.

## 2022-10-02 NOTE — PLAN OF CARE
Goal Outcome Evaluation:    Plan of Care Reviewed With: patient, spouse     Overall Patient Progress: improving    Outcome Evaluation: Pt and SO excited for induction today.    Pt admitted for PPROM of twin Aand  labor. RN assumed care at 1900. VSS, EFM charted (see flowsheet), pt afebrile. Pt denies headache, vision changes, RUQ pain, bleeding, and back pain. Pt comfortable in bed with support person, Ernesto, at bedside. Pt stated she has no questions or concerns with plan of care at this time. Call light within reach. Report given to Dasia QUIROZ.

## 2022-10-02 NOTE — PLAN OF CARE
Pt remains comfortable with contractions. Continuing to titrate pitocin. SVE by Dr Johnson at 1530, 4/90/-2. Afebrile, very scant amniotic fluid. FHR x2 consistently category 1. Frequent position changes, using peanut ball. Anticipate continued progress and planned vaginal delivery in OR.

## 2022-10-03 ENCOUNTER — HEALTH MAINTENANCE LETTER (OUTPATIENT)
Age: 32
End: 2022-10-03

## 2022-10-03 ENCOUNTER — MEDICAL CORRESPONDENCE (OUTPATIENT)
Dept: HEALTH INFORMATION MANAGEMENT | Facility: CLINIC | Age: 32
End: 2022-10-03

## 2022-10-03 LAB
ABO/RH(D): NORMAL
FETAL BLEED SCREEN: NORMAL
HGB BLD-MCNC: 10.2 G/DL (ref 11.7–15.7)
HOLD SPECIMEN: NORMAL
SPECIMEN EXPIRATION DATE: NORMAL

## 2022-10-03 PROCEDURE — 88307 TISSUE EXAM BY PATHOLOGIST: CPT | Mod: 26 | Performed by: PATHOLOGY

## 2022-10-03 PROCEDURE — 250N000011 HC RX IP 250 OP 636: Performed by: STUDENT IN AN ORGANIZED HEALTH CARE EDUCATION/TRAINING PROGRAM

## 2022-10-03 PROCEDURE — 85018 HEMOGLOBIN: CPT | Performed by: STUDENT IN AN ORGANIZED HEALTH CARE EDUCATION/TRAINING PROGRAM

## 2022-10-03 PROCEDURE — 36415 COLL VENOUS BLD VENIPUNCTURE: CPT | Performed by: STUDENT IN AN ORGANIZED HEALTH CARE EDUCATION/TRAINING PROGRAM

## 2022-10-03 PROCEDURE — 120N000002 HC R&B MED SURG/OB UMMC

## 2022-10-03 PROCEDURE — 88307 TISSUE EXAM BY PATHOLOGIST: CPT | Mod: TC | Performed by: STUDENT IN AN ORGANIZED HEALTH CARE EDUCATION/TRAINING PROGRAM

## 2022-10-03 PROCEDURE — 722N000001 HC LABOR CARE VAGINAL DELIVERY SINGLE

## 2022-10-03 PROCEDURE — 85461 HEMOGLOBIN FETAL: CPT | Performed by: STUDENT IN AN ORGANIZED HEALTH CARE EDUCATION/TRAINING PROGRAM

## 2022-10-03 PROCEDURE — 250N000013 HC RX MED GY IP 250 OP 250 PS 637: Performed by: STUDENT IN AN ORGANIZED HEALTH CARE EDUCATION/TRAINING PROGRAM

## 2022-10-03 RX ADMIN — IBUPROFEN 800 MG: 800 TABLET, FILM COATED ORAL at 06:21

## 2022-10-03 RX ADMIN — IBUPROFEN 800 MG: 800 TABLET, FILM COATED ORAL at 12:08

## 2022-10-03 RX ADMIN — HUMAN RHO(D) IMMUNE GLOBULIN 300 MCG: 1500 SOLUTION INTRAMUSCULAR; INTRAVENOUS at 12:08

## 2022-10-03 RX ADMIN — ACETAMINOPHEN 650 MG: 325 TABLET, FILM COATED ORAL at 15:30

## 2022-10-03 RX ADMIN — ACETAMINOPHEN 650 MG: 325 TABLET, FILM COATED ORAL at 10:58

## 2022-10-03 RX ADMIN — ACETAMINOPHEN 650 MG: 325 TABLET, FILM COATED ORAL at 02:38

## 2022-10-03 RX ADMIN — IBUPROFEN 800 MG: 800 TABLET, FILM COATED ORAL at 20:16

## 2022-10-03 RX ADMIN — DOCUSATE SODIUM 100 MG: 100 CAPSULE, LIQUID FILLED ORAL at 21:27

## 2022-10-03 RX ADMIN — ACETAMINOPHEN 650 MG: 325 TABLET, FILM COATED ORAL at 06:21

## 2022-10-03 ASSESSMENT — ACTIVITIES OF DAILY LIVING (ADL)
ADLS_ACUITY_SCORE: 18
ADLS_ACUITY_SCORE: 20
ADLS_ACUITY_SCORE: 20
ADLS_ACUITY_SCORE: 18
ADLS_ACUITY_SCORE: 18
ADLS_ACUITY_SCORE: 20
ADLS_ACUITY_SCORE: 18
ADLS_ACUITY_SCORE: 20

## 2022-10-03 NOTE — PROVIDER NOTIFICATION
10/03/22 0005   Provider Notification   Provider Name/Title Dr. Ballard   Method of Notification Electronic Page   Notification Reason Bleeding   FYI current qbl at 380

## 2022-10-03 NOTE — PROGRESS NOTES
In to recheck patient, SVE anterior lip, 0 station, asynclitic with ear palpated at 10 o'clock. Late decel with contraction and check down to 90s, improved with left tilt on OR table. Will recheck in 30 minutes, sooner prn tracing concerns.       Mary Ballard MD  OB/GYN Resident PGY-3  10:01 PM October 2, 2022

## 2022-10-03 NOTE — PROVIDER NOTIFICATION
10/02/22 2051   Provider Notification   Provider Name/Title Dr. Ballard   Method of Notification Electronic Page   Request Evaluate - Remote;Evaluate in Person   Notification Reason Status Update   FYI pt feeling more constant pressure, does not want SVE right now.

## 2022-10-03 NOTE — PROGRESS NOTES
Labor Progress Note     S: Feeling more rectal pressure.     O:  Vitals:    10/02/22 1945 10/02/22 1947 10/02/22 2037 10/02/22 2042   BP: 127/74   120/62   BP Location: Right arm   Right arm   Patient Position: Semi-Steven's   Semi-Steven's   Cuff Size: Adult Regular   Adult Regular   Pulse:       Resp: 16   16   Temp: 98.1  F (36.7  C)   98.2  F (36.8  C)   TempSrc: Oral   Oral   SpO2:  99% 98%    Weight:       Height:         General: NAD  SVE: 8/100/0, cervix stretchy, baby feels LYNN    FHT: A - Baseline 130, moderate variability, accelerations present, no decelerations  B - Baseline 145, moderate variability, accelerations present, no decelerations  TOCO: 4-5 contractions in ten minutes    A/P: 32 year old  at 34w0d, admitted with PPROM, s/p 13 day antepartum stay now undergoing IOL. Pregnancy notable for di-di twins, Rh negative, history of gHTN, tobacco use.    Making good progress, now 8 cm w/ stretchy cervix, will move back to OR.     - Induction: Continue pitocin, titrate per protocol.  - FWB: Category I for both twins, reactive and reassuring.  - GBS negative: no abx indicated    Anticipate vaginal delivery as both twins cephalic/cephalic with concordant growth. Will deliver in OR. Pt consented for breech extraction, forceps delivery, or  delivery if necessary.      Mary Ballard MD  OB/GYN Resident PGY-3  9:15 PM 2022

## 2022-10-03 NOTE — PLAN OF CARE
Data: Osman Banks transferred to [facility] via wheelchair at 0205. Pt and partner visited babies in NICU prior to transfer.  Action: Receiving unit notified of transfer: Yes. Patient and family notified of room change. Report given to April RN at 0038. Belongings sent to receiving unit. Accompanied by Registered Nurse. Oriented patient to surroundings. Call light within reach.   Response: Patient tolerated transfer and is stable.

## 2022-10-03 NOTE — PLAN OF CARE
[id number]    Vitals stable, voiding without difficulties.   Denies pain after a dose of Tylenol.   Up ad luis & visited babies in NICU.  Pt deciding whether she will pump or not.   Encouraged to start pumping.   Pt undecided Re: pumping.   Will continue on supportive.       Plan of Care Reviewed With: patient     Overall Patient Progress: improving

## 2022-10-03 NOTE — PROGRESS NOTES
Patient arrived to [facility] unit via wheelchair at 0205,with belongings, accompanied by spouse/ significant other, with infant in NICU. Received report from RICHIE Masterson and checked bands. Unit and room orientation started. Call light within arms reach; no concerns present at this time. Continue with plan of care.

## 2022-10-03 NOTE — PLAN OF CARE
Vaginal Delivery Note   of viable Male and Female with Dr. Hernandez Mims and Dr. Lopez in attendance.  NICU and Nursery RN Martha present.  Infants with spontaneous cry, dried and stimulated.  APGAR Baby A at 1 minute:  9 and APGAR at 5 minutes:  9.  APGAR Baby B at 1 minute:  9 and APGAR at 5 minutes:  9. Infants banded and taken to NICU. Placenta delivered with out complication, no laceration, coy cares provided.  Mother in stable condition.

## 2022-10-03 NOTE — L&D DELIVERY NOTE
OB Vaginal Delivery Note    Osman Banks MRN# [id number]   Age: 32 year old YOB: 1990     L&D Delivery Note:     Osman Banks is a 32 year old now  who was transferred from [place] for PPROM on  at 32w1d. Pregnancy was notable for di-di twin pregnancy, ART w/ letrozole this pregnancy, anemia, h/o gHTN, h/o PCOS, and Rh negative status. She received a BMZ course and was started on vancomycin for GBS unknown status and penicillin allergy. She progressed quickly to /-2, but then labor stalled. She was transitioned to clindamycin and azithromycin for latency antibiotics and transferred to the antepartum floor. She remained stable without signs of infection and had a 14d antepartum stay. Her twins were cephalic/cephalic and she desired a vaginal delivery. She was counseled regarding the risk of need for CS and consented for CS if indicated for fetal status. She was also counseled regarding breech extraction of the second twin and possible operative delivery with forceps and agreed to proceed with induction of labor and attempted vaginal delivery. She was transferred back to L&D at 34w0d for IOL.     Her IOL began with pitocin. She was augmented with AROM of a forebag. She received an epidural for pain control and was GBS neg so did not require intrapartum antibiotics. Labor course was uncomplicated with reassuring fetal status throughout. After AROM she reported increased rectal pressure and was found to be 8 cm. She was transferred to the OR for delivery and progressed to complete without incident. She pushed for about 4 minutes, after which she had a spontaneous vaginal delivery of a viable male infant in LYNN position. No nuchal cord was noted. Apgars of 9 and 9 with weight of 2070 grams. The cord was double clamped after 60 seconds and cut. A cord segment and cord blood were obtained.      BSUS confirmed twin B remained in cephalic presentation. SVE after delivery of  twin A was 8/90/-2 with twin B slightly ballotable. IV pitocin was continued for augmentation. AROM was performed for moderate, clear fluid when fetal station was -1 and fetus no longer ballotable. The patient then progressed to complete and over the course of one contraction delivered a viable female infant in LYNN position. A nuchal cord was noted, delivered through, and then reduced. Apgars were 9 and 9 and weight was 2126 g.     40U of IV pitocin was started. The placentas was then delivered using gentle traction and suprapubic pressure. The uterus was noted to be firm after fundal massage. The perineum was assessed for lacerations and none were noted. Total QBL was 200 cc. A dose of [place] misoprostol was given for prophylaxis due to multiple gestation. The placentas appeared intact with a 3V umbilical cord.  Dr. Mims was present for the entire procedure.     Mary Ballard MD  OB/GYN Resident PGY-3  11:57 PM 2022      I was present and scrubbed for entire procedure.   Mel Mims MD      GA: 34w0d  GP:   Labor Complications:      Daryder, Male-A Osman [[id number]]   None      Daufenkrystyna, Female-B Osman [[id number]]   None     EBL:      Jocelyn, Male-A Osman [[id number]]         Daufenbach, Female-B Osman [[id number]]       mL  Delivery QBL: 200 mL  Delivery Type:      Daufenkrystyna, Male-A Osman [[id number]]   Vaginal, Spontaneous      Daufenkrystyna, Female-B Osman [[id number]]   Vaginal, Spontaneous     ROM to Delivery Time: Baby 1: (Delivered) Days: 13 Hours: 18 Minutes: 38  Baby 2: (Delivered) Days: 13 Hours: 18 Minutes: 50   Weight:      Jocelyn, Male-A Osman [[id number]]   2.07 kg (4 lb 9 oz)      Daufenkrystyna, Female-B Osman [[id number]]   2.126 kg (4 lb 11 oz)      1 Minute 5 Minute 10 Minute   Apgar Totals:    Jocelyn, Male-A Osman [[id number]]   9      Daufenbach, Female-B Osman [[id number]]   9        Daufenbach, Male-A Osman [[id number]]   9      Jocelyn,  Female-B Osman [[id number]]   9        Daufenbach, Male-A Osman [[id number]]         Daufenbach, Female-B Osman [[id number]]               Daufenbach, Male-A Osman [[id number]]   MEAGHAN TONG;MARTITA COSBY;AQUILES GUNDERSON;ADITI RODRIGUEZ      Daufenbach, Female-B Osman [[id number]]   MEAGHAN TONG;MARTITA COSBY;AQUILES GUNDERSNO;ADITI RODRIGUEZ       Delivery Details:  [facility] 32 [facility]  female delivered a viable infant with apgars of      Daufenbach, Male-A Osman [[id number]]   9      Daufenbach, Female-B Osman [[id number]]   9    and      Daufenbach, Male-A Osman [[id number]]   9      Daufenbach, Female-B Osman [[id number]]   9   . Patient was fully dilated and pushing after      Daufenbach, Male-A Osman [[id number]]   3      Daufenbach, Female-B Osman [[id number]]   3    hours      Daufenbach, Male-A Osman [[id number]]   49      Daufenbach, Female-B Osman [[id number]]   49    minutes in active labor. Delivery was via      Daufenbach, Male-A Osman [[id number]]   vaginal, spontaneous      Daufenbach, Female-B Osman [[id number]]   vaginal, spontaneous    to a sterile field under      Daufenbach, Male-A Osman [[id number]]   epidural      Daufenbach, Female-B Osman [[id number]]   epidural    anesthesia. Infant delivered in      Daufenbach, Male-A Osman [[id number]]   vertex      Daufenbach, Female-B Osman [[id number]]   vertex         Daufenbach, Male-A Osman [[id number]]   left      Daufenbach, Female-B Osman [[id number]]   left         Daufenbach, Male-A Osman [[id number]]   occiput      Daufenbach, Female-B Osman [[id number]]   occiput         Daufenbach, Male-A Osman [[id number]]   anterior      Daufenkrystyna, Female-B Osman [[id number]]   anterior    position. Anterior and posterior shoulders delivered without difficulty. The cord was clamped, cut twice and      Jocelyn, Male-A Osman [[id number]]   3 vessels      Jocelyn, Female-B Osman [[id number]]   3 vessels     were noted. Cord blood was obtained in routine fashion with the following disposition:      Jocelyn Male-A Osman [[id number]]   lab      Daufben, Female-B Osman [[id number]]   lab   .      Cord complications:      Jocelyn Male-A Osman [[id number]]   none      Jocelyn, Female-B Osman [[id number]]   nuchal     Placenta delivered at      Jocelyn Male-A Osman [[id number]]   10/2/2022 10:58 PM      Daryder, Female-B Osman [[id number]]   10/2/2022 10:58 PM   . Placental disposition was      Jocelyn Male-A Osman [[id number]]   Pathology      Daryder, Female-B Osman [[id number]]   Pathology   . Fundal massage performed and fundus found to be firm.     Episiotomy:      Jocelyn Male-A Osman [[id number]]   none      Jocelyn, Female-B Osman [[id number]]   none      Perineum, vagina, cervix were inspected, and the following lacerations were noted:   Perineal lacerations:      Jocelyn Male-A Osman [[id number]]   none      Daryder, Female-B Osman [[id number]]   none        Jocelyn, Male-A Osman [[id number]]         Daufenbach, Female-B Osman [[id number]]            Daufenbach, Male-A Osman [[id number]]         Daufenbach, Female-B Osman [[id number]]            Daufenbach, Male-A Osman [[id number]]         Daufenbach, Female-B Osman [[id number]]            Daufenbach, Male-A Osman [[id number]]         Daufenbach, Female-B Osman [[id number]]            Daufenbach, Male-A Osman [[id number]]         Daufenbach, Female-B Osman [[id number]]        Excellent hemostasis was noted. Needle count correct. Infant and patient in delivery room in good and stable condition.        Félix Banks [[id number]]      Labor Event Times      Labor onset date: 10/2/22 Onset time:  6:45 PM   Dilation complete date: 10/2/22 Complete time: 10:34 PM   Start pushing date/time: 10/2/2022 3665          Labor Length      1st Stage (hrs): 3 (min): 49   2nd Stage (hrs): 0 (min): 3   3rd Stage (hrs): 0  (min): 19          Labor Events     labor?: Yes   steroids: Full Course  Labor Type: Augmentation, Spontaneous  Predominate monitoring during 1st stage: continuous electronic fetal monitoring     Antibiotics received during labor?: No  Reason for Antibiotics: GBS       Rupture date/time: 22 0400   Rupture type: Artificial Rupture of Membranes  Fluid color: Clear, Other (comment)  Fluid odor: Normal       Induction date/time:     Cervical ripening date/time:     Indications for induction: Premature ROM     Augmentation: Oxytocin, AROM  Indications for augmentation: Ineffective Contraction Pattern, Prolonged ROM  1:1 continuous labor support provided by?: RN Labor partogram used?: no          Delivery/Placenta Date and Time      Delivery Date: 10/2/22 Delivery Time: 10:38 PM   Placenta Date/Time: 10/2/2022 10:58 PM  Oxytocin given at the time of delivery: after delivery of placenta  Delivering clinician: Mel Mims MD   Other personnel present at delivery:  Provider Role   Aleja Allred, RN Delivery Nurse   Mel Berrios RN Delivery Nurse   Evan Richmond RN Delivery Assist   Mary Ballard MD Resident             Vaginal Counts       Initial count performed by 2 team members:  Two Team Members   Aleja Mims MD         [place]rystal Suture Needles Sponges (RETIRED) Instruments   Initial counts 2  5    Added to count       Relief counts       Final counts               Placed during labor Accounted for at the end of labor   FSE No NA   IUPC No NA   Cervidil No NA                             Apgars    Living status: Living   1 Minute 5 Minute 10 Minute 15 Minute 20 Minute   Skin color: 1  1       Heart rate: 2  2       Reflex irritability: 2  2       Muscle tone: 2  2       Respiratory effort: 2  2       Total: 9  9       Apgars assigned by: PABLITO SANCHEZ       Cord      Vessels: 3 Vessels    Cord Complications: None               Cord Blood  Disposition: Lab    Gases Sent?: No    Delayed cord clamping?: Yes    Cord Clamping Delay (seconds): 31-60 seconds    Stem cell collection?: No           [place] Resuscitation     Care at Delivery: Asked by Dr. Mims to attend the delivery of this , male infant with a gestational age of  34 0/7 weeks secondary to premature delivery     60 seconds of delayed cord clamping were completed.  The infant was stimulated, cried and had spontaneous respirations during delayed cord clamping.  The infant was placed on a warmer, dried and stimulated.   Gross PE is WNL.  Infant required no further resuscitation.  Infant was shown to mother and father and will be transferred to the NICU for further care.    Alvin Resendez, NNP, DNP 2022 11:08 PM          Measurements      Weight: 4 lb 9 oz           Labor Events and Shoulder Dystocia    Fetal Tracing Prior to Delivery: Category 1  Shoulder dystocia present?: Neg       Delivery (Maternal) (Provider to Complete) ([id number])    Episiotomy: None  Perineal lacerations: None    Repair suture: None  Genital tract inspection done: Pos       Blood Loss  Mother: Osman Banks #[id number]     Start of Mother's Information      Delivery Blood Loss  10/02/22 1845 - 10/02/22 2326      Delivery QBL (mL) Hospital Encounter 200 mL    Total  200 mL               End of Mother's Information  Mother: Osman Banks #[id number]                Delivery - Provider to Complete ([id number])    Delivering clinician: Mel Mims MD  Attempted Delivery Types (Choose all that apply): Spontaneous Vaginal Delivery  Delivery Type (Choose the 1 that will go to the Birth History): Vaginal, Spontaneous                         Other personnel:  Provider Role   Aleja Allred RN Delivery Nurse   Mel Berrios RN Delivery Nurse   Evan Richmond RN Delivery Assist   Mary Ballard MD Resident                    Placenta    Date/Time:  10/2/2022 10:58 PM  Removal: Spontaneous  Disposition: Pathology             Anesthesia    Method: Epidural  Cervical dilation at placement: 4-7                    Presentation and Position    Presentation: Vertex    Position: Left Occiput Anterior                  Sharona Banks-B Osman [[id number]]      Labor Event Times      Labor onset date: 10/2/22 Onset time:  6:45 PM   Dilation complete date: 10/2/22 Complete time: 10:35 PM   Start pushing date/time: 10/2/2022 2235          Labor Length      1st Stage (hrs): 3 (min): 49   2nd Stage (hrs): 0 (min): 14   3rd Stage (hrs): 0 (min): 8          Labor Events     labor?: Yes   steroids: Full Course  Labor Type: Spontaneous, Augmentation  Predominate monitoring during 1st stage: continuous electronic fetal monitoring     Antibiotics received during labor?: No  Antibiotics received for GBS: Clindamycin, Vancomycin       Rupture date/time: 22 0400   Rupture type: Artificial Rupture of Membranes  Fluid color: Clear, Other (comment)  Fluid odor: Normal       Induction date/time:     Cervical ripening date/time:     Indications for induction: Premature ROM     Augmentation: AROM  Indications for augmentation: Prolonged ROM, Ineffective Contraction Pattern  1:1 continuous labor support provided by?: RN Labor partogram used?: no          Delivery/Placenta Date and Time      Delivery Date: 10/2/22 Delivery Time: 10:50 PM   Placenta Date/Time: 10/2/2022 10:58 PM  Oxytocin given at the time of delivery: after delivery of placenta  Delivering clinician: Mel Mims MD   Other personnel present at delivery:  Provider Role   Aleja Allred RN Delivery Nurse   Mel Berrios RN Delivery Nurse   Evan Richmond RN Delivery Assist   Mary Ballard MD Resident             Vaginal Counts       Initial count performed by 2 team members:  Two Team Members   Aleja Mims MD            [place] Suture Needles Sponges  (RETIRED) Instruments   Initial counts 2  5    Added to count       Relief counts       Final counts               Placed during labor Accounted for at the end of labor   FSE No NA   IUPC No NA   Cervidil No NA                             Apgars    Living status: Living   1 Minute 5 Minute 10 Minute 15 Minute 20 Minute   Skin color: 1  1       Heart rate: 2  2       Reflex irritability: 2  2       Muscle tone: 2  2       Respiratory effort: 2  2       Total: 9  9       Apgars assigned by: PABLITO SANCHEZ       Cord      Vessels: 3 Vessels    Cord Complications: Nuchal   Nuchal Intervention: reduced, delivered through         Nuchal cord description: loose nuchal cord         Cord Blood Disposition: Lab    Gases Sent?: No    Delayed cord clamping?: Yes    Cord Clamping Delay (seconds): 31-60 seconds    Stem cell collection?: No            Resuscitation    Methods: None  [facility] Care at Delivery: Asked by Dr. Mims to attend the delivery of this term, female infant with a gestational age of 34 0/7 weeks secondary to premature delivery.      60 seconds of delayed cord clamping were completed.  The infant was stimulated, cried and had spontaneous respirations during delayed cord clamping.  The infant was placed on a warmer, dried and stimulated.   Gross PE is WNL.  Infant required no further resuscitation.  Infant was shown to mother and father and will be transferred to the NICU for further care.    Pablito Sanchez, LANDEN, DNP 2022 11:11 PM           [place] Measurements      Weight: 4 lb 11 oz           Labor Events and Shoulder Dystocia    Fetal Tracing Prior to Delivery: Category 1  Shoulder dystocia present?: Neg       Delivery (Maternal) (Provider to Complete) ([id number])    Episiotomy: None  Perineal lacerations: None    Repair suture: None       Blood Loss  Mother: Osman Banks #[id number]     Start of Mother's Information      Delivery Blood Loss  10/02/22 1845 - 10/02/22 0636       Delivery QBL (mL) Hospital Encounter 200 mL    Total  200 mL               End of Mother's Information  Mother: Osman Banks #[id number]                Delivery - Provider to Complete ([id number])    Delivering clinician: Mel Mims MD  Attempted Delivery Types (Choose all that apply): Spontaneous Vaginal Delivery  Delivery Type (Choose the 1 that will go to the Birth History): Vaginal, Spontaneous                         Other personnel:  Provider Role   Aleja Allred, RN Delivery Nurse   Mel Berrios RN Delivery Nurse   Evan Richmond RN Delivery Assist   Mary Ballard MD Resident                    Placenta    Date/Time: 10/2/2022 10:58 PM  Removal: Spontaneous  Disposition: Pathology             Anesthesia    Method: Epidural  Cervical dilation at placement: 4-7                    Presentation and Position    Presentation: Vertex    Position: Left Occiput Anterior                     Mary Ballard MD

## 2022-10-03 NOTE — PROVIDER NOTIFICATION
10/03/22 0050   Provider Notification   Provider Name/Title Dr. Ballard   Method of Notification Electronic Page   Request Evaluate - Remote   Notification Reason Other (Comment)   Could you put in an order for the placenta?

## 2022-10-03 NOTE — PROVIDER NOTIFICATION
10/02/22 1845   Provider Notification   Provider Name/Title Dr Ballard/Dr GEOFF Mims   Method of Notification At Bedside   Notification Reason Status Update   SVE by Dr Ballard, 4.5/90/-2 with forebag. Pt agreeable to AROM of forebag, clear fluid released.

## 2022-10-03 NOTE — PLAN OF CARE
Goal Outcome Evaluation:  Afebrile. VSS, except 0-4/10. Fundus U2, scant, rubra lochia. LS clear on RA. Good PO intake, good appetite. Voiding independently, had a small BM. Taking IBU and tylenol as needed. Bonding well with infants([place] and [place]) in NICU. Patient is still considering her feeding plan, but started pumping. Plan to continue monitoring. Hourly monitoring completed, will continue to monitor.     Problem: Plan of Care - These are the overarching goals to be used throughout the patient stay.    Goal: Absence of Hospital-Acquired Illness or Injury  Intervention: Prevent Skin Injury  Recent Flowsheet Documentation  Taken 10/3/2022 0750 by Bety Trejo RN  Body Position: position changed independently  Intervention: Prevent and Manage VTE (Venous Thromboembolism) Risk  Recent Flowsheet Documentation  Taken 10/3/2022 0750 by Bety Trejo RN  VTE Prevention/Management: (ambulation and fluids promoted) other (see comments)  Activity Management: up ad luis  Intervention: Prevent Infection  Recent Flowsheet Documentation  Taken 10/3/2022 0750 by Bety Trejo RN  Infection Prevention:    hand hygiene promoted    personal protective equipment utilized    rest/sleep promoted    single patient room provided  Goal: Optimal Comfort and Wellbeing  Intervention: Provide Person-Centered Care  Recent Flowsheet Documentation  Taken 10/3/2022 0750 by Bety Trejo RN  Trust Relationship/Rapport:    care explained    choices provided    emotional support provided    empathic listening provided    questions encouraged    questions answered    reassurance provided     Problem: Change in Fetal Wellbeing (Labor)  Goal: Stable Fetal Wellbeing  Intervention: Promote and Monitor Fetal Wellbeing  Recent Flowsheet Documentation  Taken 10/3/2022 0750 by Bety Trejo RN  Body Position: position changed independently     Problem: Infection (Labor)  Goal: Absence of Infection Signs and Symptoms  Intervention: Prevent  or Manage Infection  Recent Flowsheet Documentation  Taken 10/3/2022 0750 by Bety Trejo RN  Infection Prevention:    hand hygiene promoted    personal protective equipment utilized    rest/sleep promoted    single patient room provided  Infection Management: aseptic technique maintained     Problem: Uterine Tachysystole (Labor)  Goal: Normal Uterine Contraction Pattern  Intervention: Monitor and Manage Uterine Activity  Recent Flowsheet Documentation  Taken 10/3/2022 0750 by Bety Trejo RN  Fluid/Electrolyte Management: fluids provided     Problem: Maternal-Fetal Wellbeing  Goal: Optimal Maternal-Fetal Wellbeing  Intervention: Support Psychosocial Response to Complications During Pregnancy  Recent Flowsheet Documentation  Taken 10/3/2022 0750 by Bety Trejo RN  Supportive Measures:    active listening utilized    positive reinforcement provided    self-care encouraged  Family/Support System Care:    presence promoted    self-care encouraged    support provided    involvement promoted     Problem: Multiple Gestation  Goal: Pregnancy Maintained Until Desired Gestational Age  Intervention: Support Wellbeing During Multiple Gestation Pregnancy  Recent Flowsheet Documentation  Taken 10/3/2022 0750 by Bety Trejo RN  Supportive Measures:    active listening utilized    positive reinforcement provided    self-care encouraged     Problem: Adjustment to Role Transition (Postpartum Vaginal Delivery)  Goal: Successful Maternal Role Transition  Intervention: Support Maternal Role Transition  Recent Flowsheet Documentation  Taken 10/3/2022 0750 by Bety Trejo RN  Supportive Measures:    active listening utilized    positive reinforcement provided    self-care encouraged  Parent/Child Attachment Promotion:    caring behavior modeled    face-to-face positioning promoted    interaction encouraged    parent/caregiver presence encouraged    participation in care promoted    positive reinforcement provided      Problem: Infection (Postpartum Vaginal Delivery)  Goal: Absence of Infection Signs/Symptoms  Intervention: Prevent or Manage Infection  Recent Flowsheet Documentation  Taken 10/3/2022 0750 by Bety Trejo RN  Infection Management: aseptic technique maintained     Problem: Urinary Retention (Postpartum Vaginal Delivery)  Goal: Effective Urinary Elimination  Intervention: Promote Effective Urinary Elimination  Recent Flowsheet Documentation  Taken 10/3/2022 0750 by Bety Trejo RN  Urinary Elimination Promotion: frequent voiding encouraged

## 2022-10-03 NOTE — PROGRESS NOTES
Labor Progress Note     S: Feeling more pressures and contractions are stronger after AROM. Agreeable with SVE.    O:  Vitals:    10/02/22 1635 10/02/22 1700 10/02/22 1735 10/02/22 1800   BP: 116/70  120/71    BP Location: Right arm      Patient Position: Semi-Steven's      Cuff Size: Adult Regular      Pulse:       Resp: 16 18 16    Temp: 98.1  F (36.7  C)  98.2  F (36.8  C)    TempSrc: Oral  Oral    SpO2: 99% 98% 98% 100%   Weight:       Height:         General: NAD  SVE: /-1    FHT: A - Baseline 135, moderate variability, accelerations present, no decelerations  B - Baseline 145, moderate variability, accelerations present, no decelerations  TOCO: 4-5 contractions in ten minutes    A/P: 32 year old  at 34w0d, admitted with PPROM, s/p 13 day antepartum stay now undergoing IOL. Pregnancy notable for di-di twins, Rh negative, history of gHTN, tobacco use.    - Induction: Continue pitocin, titrate per protocol.  - FWB: Category I for both twins, reactive and reassuring.  - GBS negative: no abx indicated    Anticipate vaginal delivery as both twins cephalic/cephalic with concordant growth. Will deliver in OR. Pt consented for breech extraction, forceps delivery, or  delivery if necessary.      Mary Ballard MD  OB/GYN Resident PGY-3  7:29 PM 2022

## 2022-10-03 NOTE — PLAN OF CARE
Goal Outcome Evaluation:        Data: VSS, postpartum assessments WNL. Pt voided x2 before transfer to [facility], up as  tolerated, eating and drinking normally. Perineum appears to be healing well, lochia WNL, no s/s infection. Infants formula feeding in NICU. Taking tylenol and toradol for pain. Reports good pain management.  Encouraged pt to pump. Pt goal was rest after delivery.   Action: Education provided on plan of care.  Response: Pt is agreeable with her plan of care. Positive attachment behaviors observed with infant. Ernesto significant other present. Will continue to monitor.

## 2022-10-03 NOTE — PROGRESS NOTES
Postpartum Progress Note  Osman Banks  [id number]    Subjective:  Doing well after delivery, just feeling tired. Pain is well controlled, just having some cramping. Ambulating without dizziness, tolerating PO without nausea. Lochia less than a typical period. Voiding spontaneously. Passing gas, has had a small BM. Pain well controlled on oral medications. Undecided regarding feeding for babies in NICU, likely formula given she'll be home in [place]. Planning OCPs for contraception. No headache, vision changes, CP, SOB, RUQ pain.     Objective:  Patient Vitals for the past 24 hrs:   BP Temp Temp src Pulse Resp SpO2 Oximeter Heart Rate   10/03/22 0625 114/67 98.3  F (36.8  C) Oral 81 16 98 % --   10/03/22 0205 116/69 -- -- 87 16 98 % --   10/03/22 0139 124/71 -- -- -- 16 -- 74 bpm   10/03/22 0058 -- -- -- -- -- 98 % 83 bpm   10/03/22 0057 129/64 -- -- -- 18 -- 80 bpm   10/03/22 0043 -- -- -- -- -- 99 % 87 bpm   10/03/22 0042 128/59 -- -- -- 18 -- 80 bpm   10/03/22 0012 127/75 -- -- -- 16 98 % 74 bpm   10/03/22 0007 -- -- -- -- -- 99 % 73 bpm   10/02/22 2357 130/77 -- -- -- 16 100 % 67 bpm   10/02/22 2352 -- -- -- -- -- 99 % 67 bpm   10/02/22 2342 134/85 -- -- -- 18 100 % 85 bpm   10/02/22 2337 -- -- -- -- -- 100 % 76 bpm   10/02/22 2327 122/80 98  F (36.7  C) Oral -- 16 99 % 69 bpm   10/02/22 2322 -- -- -- -- -- 97 % 69 bpm   10/02/22 2317 118/74 -- -- -- 18 99 % 74 bpm   10/02/22 2254 133/80 -- -- -- 20 -- --   10/02/22 2154 126/79 98.1  F (36.7  C) Oral -- 16 -- --   10/02/22 2042 120/62 98.2  F (36.8  C) Oral -- 16 -- 69 bpm   10/02/22 2037 -- -- -- -- -- 98 % 77 bpm   10/02/22 1947 -- -- -- -- -- 99 % 77 bpm   10/02/22 1945 127/74 98.1  F (36.7  C) Oral -- 16 -- 76 bpm   10/02/22 1900 -- -- -- -- -- 99 % 79 bpm   10/02/22 1800 -- -- -- -- -- 100 % 79 bpm   10/02/22 1735 120/71 98.2  F (36.8  C) Oral -- 16 98 % 79 bpm   10/02/22 1700 -- -- -- -- 18 98 % 81 bpm   10/02/22 1635 116/70 98.1  F (36.7  C)  Oral -- 16 99 % 75 bpm   10/02/22 1600 -- -- -- -- -- 99 % 79 bpm   10/02/22 1530 127/75 98.6  F (37  C) Oral -- 18 99 % 84 bpm   10/02/22 1421 -- 98.1  F (36.7  C) Oral -- -- -- --   10/02/22 1400 122/70 -- -- -- -- 99 % 80 bpm   10/02/22 1330 122/65 -- -- -- -- 98 % 81 bpm   10/02/22 1300 117/58 98.1  F (36.7  C) Oral -- 20 98 % 80 bpm   10/02/22 1230 117/72 -- -- -- 18 99 % 85 bpm   10/02/22 1155 126/82 -- -- -- -- 99 % 98 bpm   10/02/22 1150 129/75 -- -- -- -- 99 % 92 bpm   10/02/22 1146 130/76 -- -- -- -- 98 % 99 bpm   10/02/22 1141 129/71 -- -- -- -- 100 % 98 bpm   10/02/22 1135 129/72 -- -- -- -- 99 % 107 bpm   10/02/22 1132 126/72 -- -- -- -- 100 % 109 bpm   10/02/22 1131 128/79 -- -- -- -- 99 % 112 bpm   10/02/22 1128 127/86 -- -- -- -- 97 % 113 bpm   10/02/22 1126 -- -- -- -- -- 99 % 103 bpm   10/02/22 1124 134/86 -- -- -- -- 99 % 116 bpm   10/02/22 0955 (!) 141/82 98.3  F (36.8  C) Oral -- -- -- 113 bpm     General: NAD, comfortable  Heart: Warm and well perfused   Lungs: Breathing comfortably on room air  Abdomen: Soft, non-tender, non-distended; fundus firm and 3 cm below umbilicus  Extremities: Trace edema in BLE    Labs:  Hgb 11.1 >  (MT miso) > 10.2     Assessment/Plan: 32 year old  who is PPD#1 s/p  at 34w0d in the setting of di-di twin pregnancy and PPROM. S/p 14d antepartum stay. Pregnancy was also notable for h/o gHTN, tobacco use, and Rh negative status. Currently stable and doing well.     # Postpartum  - Continue routine post-partum cares.     - Heme: Appropriate blood loss during delivery. AM Hgb 10.2, mild acute blood loss anemia, asymptomatic. Continue to monitor for s/s of anemia. Repeat labs prn.  - Pain: Continue scheduled Tylenol and iburprofen  - GI: PRN senna, miralax daily, simethicone, anti-emetics.  - : Voiding spontaneously  - Babies: Stable in NICU, likely will formula feed   - Contraception: Will discuss w/ primary OB at 6 wks, likely OCPs  - Rh neg, Rubella  "immune  - PPx: Encourage ambulation    # H/o gHTN  # MR BP x1  - Continue close monitoring of BP, HELLP labs prn     Dispo: Discharge to home when meeting postpartum goals      Mary Ballard MD  OB/GYN Resident PGY-3  9:04 AM October 3, 2022      /82   Pulse 88   Temp 98  F (36.7  C) (Oral)   Resp 16   Ht 1.676 m (5' 6\")   Wt 87.4 kg (192 lb 9.6 oz)   SpO2 98%   Breastfeeding Unknown   BMI 31.09 kg/m    Hemoglobin   Date Value Ref Range Status   10/03/2022 10.2 (L) 11.7 - 15.7 g/dL Final     The patient was seen and examined by me separately from the team.  I have reviewed and agree with the above note.  She is doing well this morning, just having some cramping.  Babies are stable in the NICU.  She is planning to board at the [facility] after discharge.  Continue routine postpartum care, discussed likely discharge tomorrow.     Mary Baxter MD, FACOG      "

## 2022-10-04 VITALS
RESPIRATION RATE: 16 BRPM | SYSTOLIC BLOOD PRESSURE: 129 MMHG | WEIGHT: 192.6 LBS | HEIGHT: 66 IN | TEMPERATURE: 97.9 F | BODY MASS INDEX: 30.95 KG/M2 | OXYGEN SATURATION: 98 % | DIASTOLIC BLOOD PRESSURE: 81 MMHG | HEART RATE: 67 BPM

## 2022-10-04 PROCEDURE — 250N000013 HC RX MED GY IP 250 OP 250 PS 637: Performed by: STUDENT IN AN ORGANIZED HEALTH CARE EDUCATION/TRAINING PROGRAM

## 2022-10-04 RX ORDER — IBUPROFEN 600 MG/1
600 TABLET, FILM COATED ORAL EVERY 6 HOURS PRN
Qty: 60 TABLET | Refills: 0 | Status: SHIPPED | OUTPATIENT
Start: 2022-10-04

## 2022-10-04 RX ORDER — AMOXICILLIN 250 MG
1 CAPSULE ORAL DAILY
Qty: 100 TABLET | Refills: 0 | Status: SHIPPED | OUTPATIENT
Start: 2022-10-04

## 2022-10-04 RX ORDER — ACETAMINOPHEN 325 MG/1
650 TABLET ORAL EVERY 6 HOURS PRN
Qty: 100 TABLET | Refills: 0 | Status: SHIPPED | OUTPATIENT
Start: 2022-10-04

## 2022-10-04 RX ADMIN — ACETAMINOPHEN 650 MG: 325 TABLET, FILM COATED ORAL at 08:32

## 2022-10-04 RX ADMIN — DOCUSATE SODIUM 100 MG: 100 CAPSULE, LIQUID FILLED ORAL at 08:32

## 2022-10-04 RX ADMIN — IBUPROFEN 800 MG: 800 TABLET, FILM COATED ORAL at 06:04

## 2022-10-04 ASSESSMENT — ACTIVITIES OF DAILY LIVING (ADL)
ADLS_ACUITY_SCORE: 18

## 2022-10-04 NOTE — PLAN OF CARE
VSS, postpartum assessment WDL, pain well managed with tylenol and ibuprofen, meeting all postpartum goals, has decided against pumping at this time.  Denies questions or concerns after reviewing discharge instructions.  Discharge to home.

## 2022-10-04 NOTE — CONSULTS
[facility]  MATERNAL CHILD HEALTH   INITIAL NICU PSYCHOSOCIAL ASSESSMENT     DATA:     Presenting Information: Mom is a  who delivered twins, a boy and a girl on 2022 at 34w0d gestation in the setting of PPROM and  labor. Baby was admitted to the NICU for management of prematurity.  SW was consulted to meet with this family per NICU admission of infants.     Living Situation: Parents are unmarried and live together in [place], [place] in [facility], along with their two and a half year old son, Junaid.    Social Support: Osman reports they have many friends and neighbors who have helped care for their pets and their home.  She reports it has been harder to find  assistance outside their family.      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  Ernesto is not currently employed.     Insurance: [employer]     Source of Financial Support: parental employment     Mental Health History: no     History of Postpartum Mood Disorders: no     Chemical Health History: no     Legal/Child Protection Involvement: no    INTERVENTION:       Chart review    Collaboration with team    Conducted Psychosocial Assessment    Introduction to Maternal Child Health SW role and scope of practice    Orientation to the NICU (parking, lodging, meals, visitation)    Validated emotions and provided supportive listening    Provided resources and referrals    Monthly parking pass    Provided psychoeducation on  mood disorders and indicated that SW would continue to monitor mood and support bridging to mental health resources as needed.    Provided SW contact info    ASSESSMENT:     Coping: Osman reports she is feeling better since the twins were born and doing well.  She and Ernesto also report they are deeply grateful for the lodging space at [place].  Parents work very hard to maintain a routine for their son.  This has been very  difficult during Osman's hospitalization but Ernesto is doing the best he can in her absence.  [address] allow them to be together as a family and allows sOman to be close to the hospital to work on feeding when the twins are ready to eat.  Ernesto will need to go back and forth between home and the hospital next week and family is grateful they have a place Ernesto and Junaid can stay.  Parents seem to have a good basic understanding of the medical treatment plan for the twins.   Osman and Ernesto have very different styles of coping, Osman is the task person and can be intense at times.  Ernesto is very laid back and supportive.  Osman has no concerns about her mood at this time and is looking forward to bonding with her family.  Osman is independent and connects with her closest friends consistently.    Assessment of parental risk for PMAD: Average risk    Risk Factors: Distance from home    Resiliency Factors & Strengths: experienced parents, strong advocacy skills, resourceful    PLAN:     SW will continue to follow for supportive intervention.    Deepa ATWOODW, MSW, LICSW  Maternal Child Health

## 2022-10-04 NOTE — PROGRESS NOTES
Postpartum Progress Note  Osman Banks  [id number]    Subjective:  Osman is doing well this morning. Pain is well controlled. Ambulating without dizziness, tolerating PO without nausea. Lochia appropriate, passed a small clot last night which felt like a relief. Voiding spontaneously. Passing gas, has had a small BM. No headache, vision changes, CP, SOB, RUQ pain. Ready for discharge today.    Objective:  Patient Vitals for the past 24 hrs:   BP Temp Temp src Pulse Resp   10/04/22 0606 116/67 98.1  F (36.7  C) Oral 67 18   10/03/22 2015 117/78 98.1  F (36.7  C) Oral 86 18   10/03/22 1530 115/79 97.9  F (36.6  C) Oral 87 18   10/03/22 1058 134/82 98  F (36.7  C) Oral 88 16     General: NAD, comfortable  Heart: Warm and well perfused   Lungs: Breathing comfortably on room air  Abdomen: Soft, non-tender, non-distended; fundus firm and 3 cm below umbilicus  Extremities: Trace edema in BLE    Labs:  None    Assessment/Plan: 32 year old  who is PPD#2 s/p  at 34w0d in the setting of di-di twin pregnancy and PPROM. S/p 14d antepartum stay. Pregnancy was also notable for h/o gHTN, tobacco use, and Rh negative status. Currently stable and doing well.     # Postpartum  - Continue routine post-partum cares.     - Heme: Appropriate blood loss during delivery. AM Hgb 10.2, mild acute blood loss anemia, asymptomatic.  - Pain: Continue prn Tylenol and iburprofen  - GI: PRN senna, miralax daily, simethicone, anti-emetics.  - : Voiding spontaneously  - Babies: Stable in NICU, likely will formula feed   - Contraception: Will discuss w/ primary OB at 6 wks, likely OCPs  - Rh neg s/p postpartum rhogam. Rubella immune  - PPx: Encourage ambulation    # H/o gHTN  # MR BP x1  - BP over last 24 hours have wnl  - Continue close monitoring of BP, HELLP labs prn     Dispo: Discharge home today    Pearl Lehman MD  ObGyn, PGY-3  10/04/2022 6:43 AM     [facility] staff:  Appreciate note by Dr. Lehman.  I  have seen and examined the patient without the resident. I have reviewed, edited, and agree with the note.    My findings are: c/w above exam    Viki Dumont MD, FACOG  10/4/2022  3:57 PM

## 2022-10-04 NOTE — PROVIDER NOTIFICATION
10/03/22 2158   Provider Notification   Provider Name/Title G2 jeremiah moffett   Method of Notification Electronic Page   Request Evaluate-Remote   Notification Reason Status Update   Notified G2 Chad of pt passing a large clot into the toilet,  slightly bigger then golf ball size. Pt's bleeding at this time is scant, fundus is firm. Pt stated she felt relief after passing the clot, she was having some lower right abdominal cramping. A hat was put in the toilet in case she passes another one. Will continue to monitor.

## 2022-10-04 NOTE — PLAN OF CARE
Data: Vital signs within normal limits. Postpartum checks within normal limits - see flow record. Patient eating and drinking normally. Patient able to empty bladder independently and is up ambulating. No apparent signs of infection.  perineum  healing well. Patient performing self cares and is able to visit infant in NICU.  Action: Patient medicated during the shift for cramping. See MAR. Patient reassessed within 1 hour after each medication and pain was improved - patient stated she was comfortable. Patient education done about discharge instructions, self cares and pain management. See flow record.  Response: Positive attachment behaviors observed with infant in NICU. Support persons not present.   Plan: Anticipate discharge on 10/4/22.  Goal Outcome Evaluation:    Plan of Care Reviewed With: patient     Overall Patient Progress: improving

## 2022-10-04 NOTE — PROGRESS NOTES
Patient came from NICU and signed discharge instructions sheet which was reviewed by previous RN. And medication sheet was also signed.  Discharged patient.

## 2022-10-04 NOTE — CARE PLAN
Vital signs and assessment WNL. Uterus firm and midline with scant lochia. Denies pain at this time. Denies headache, dizziness or blurred vision. Caring for self appropriately. No concerns at this time.

## 2022-10-06 LAB
PATH REPORT.COMMENTS IMP SPEC: NORMAL
PATH REPORT.COMMENTS IMP SPEC: NORMAL
PATH REPORT.FINAL DX SPEC: NORMAL
PATH REPORT.GROSS SPEC: NORMAL
PATH REPORT.MICROSCOPIC SPEC OTHER STN: NORMAL
PATH REPORT.RELEVANT HX SPEC: NORMAL
PHOTO IMAGE: NORMAL

## 2022-11-26 LAB
BLOOD BANK CHART COMMENT: NORMAL
SPECIMEN EXPIRATION DATE: NORMAL

## 2023-10-22 ENCOUNTER — HEALTH MAINTENANCE LETTER (OUTPATIENT)
Age: 33
End: 2023-10-22

## 2024-12-15 ENCOUNTER — HEALTH MAINTENANCE LETTER (OUTPATIENT)
Age: 34
End: 2024-12-15